# Patient Record
Sex: FEMALE | Race: WHITE | NOT HISPANIC OR LATINO | Employment: FULL TIME | URBAN - METROPOLITAN AREA
[De-identification: names, ages, dates, MRNs, and addresses within clinical notes are randomized per-mention and may not be internally consistent; named-entity substitution may affect disease eponyms.]

---

## 2017-02-01 LAB
HBV SURFACE AG SERPL QL IA: NEGATIVE
RUBELLA ANTIBODY IGG QUANTITATIVE: NORMAL IU/ML

## 2017-04-27 ENCOUNTER — HOSPITAL ENCOUNTER (OUTPATIENT)
Dept: ULTRASOUND IMAGING | Facility: CLINIC | Age: 27
Discharge: HOME | End: 2017-04-27
Attending: OBSTETRICS & GYNECOLOGY | Admitting: OBSTETRICS & GYNECOLOGY
Payer: COMMERCIAL

## 2017-04-27 ENCOUNTER — OFFICE VISIT (OUTPATIENT)
Dept: MATERNAL FETAL MEDICINE | Facility: CLINIC | Age: 27
End: 2017-04-27
Attending: OBSTETRICS & GYNECOLOGY
Payer: COMMERCIAL

## 2017-04-27 ENCOUNTER — PRE VISIT (OUTPATIENT)
Dept: MATERNAL FETAL MEDICINE | Facility: CLINIC | Age: 27
End: 2017-04-27

## 2017-04-27 DIAGNOSIS — O34.30 CERVICAL FUNNELING AFFECTING PREGNANCY: Primary | ICD-10-CM

## 2017-04-27 DIAGNOSIS — O26.90 PREGNANCY RELATED CONDITION, UNSPECIFIED TRIMESTER: ICD-10-CM

## 2017-04-27 PROCEDURE — 76817 TRANSVAGINAL US OBSTETRIC: CPT | Performed by: OBSTETRICS & GYNECOLOGY

## 2017-04-27 PROCEDURE — 76811 OB US DETAILED SNGL FETUS: CPT

## 2017-04-27 NOTE — MR AVS SNAPSHOT
After Visit Summary   4/27/2017    Ashley Cordon    MRN: [id number]           Patient Information     Date Of Birth          1990        Visit Information        Provider Department      4/27/2017 12:15 PM Kassy Mijares,  [facility] Maternal Fetal Medicine [facility]        Today's Diagnoses     Cervical funneling affecting pregnancy    -  1       Follow-ups after your visit        Your next 10 appointments already scheduled     May 01, 2017  1:30 PM CDT   MFM US OB TRANSVAGINAL with SHMFMUSR3   [facility] Maternal Fetal Medicine Ultrasound - [facility] ([facility])    [address] [place] [address] [address]  [facility] [id number] [phone]           Wear comfortable clothes and leave your valuables at home.            May 01, 2017  2:00 PM CDT   Radiology MD with  RUPINDERM MD   [facility] Maternal Fetal Medicine [facility] ([facility])    [address] [place] [address]  [facility] [id number] [phone]           Please arrive at the time given for your first appointment.  This visit is used internally to schedule the physician's time during your ultrasound.              Future tests that were ordered for you today     Open Future Orders        Priority Expected Expires Ordered    MFM US OB Transvaginal Routine 5/1/2017 2/27/2018 4/27/2017    MFM US Comprehensive Single Routine  2/27/2018 4/27/2017            Who to contact     If you have questions or need follow up information about today's clinic visit or your schedule please contact [facility] MATERNAL FETAL [facility] directly at [phone].  Normal or non-critical lab and imaging results will be communicated to you by MyChart, letter or phone within 4 business days after the clinic has received the results. If you do not hear from us within 7 days, please contact the clinic through MyChart or phone. If you have a critical or abnormal lab result, we will notify you by phone as soon as  "possible.  Submit refill requests through [employer] or call your pharmacy and they will forward the refill request to us. Please allow 3 business days for your refill to be completed.          Additional Information About Your Visit        [employer] Information     [employer] lets you send messages to your doctor, view your test results, renew your prescriptions, schedule appointments and more. To sign up, go to www.[email or web address] . Click on \"Log in\" on the left side of the screen, which will take you to the Welcome page. Then click on \"Sign up Now\" on the right side of the page.     You will be asked to enter the access code listed below, as well as some personal information. Please follow the directions to create your username and password.     Your access code is: [email or web address]  Expires: 2017  1:43 PM     Your access code will  in 90 days. If you need help or a new code, please call your [place] clinic or [phone].        Care EveryWhere ID     This is your Care EveryWhere ID. This could be used by other organizations to access your [place] medical records  [id number]        Your Vitals Were     Last Period                   2016            Blood Pressure from Last 3 Encounters:   No data found for BP    Weight from Last 3 Encounters:   No data found for Wt               Primary Care Provider    None Specified       No primary provider on file.        Thank you!     Thank you for choosing [facility]  for your care. Our goal is always to provide you with excellent care. Hearing back from our patients is one way we can continue to improve our services. Please take a few minutes to complete the written survey that you may receive in the mail after your visit with us. Thank you!             Your Updated Medication List - Protect others around you: Learn how to safely use, store and throw away your medicines at www.disposemymeds.org.      Notice  As of 2017  " 1:43 PM    You have not been prescribed any medications.

## 2017-04-27 NOTE — PROGRESS NOTES
"Please see \"Imaging\" tab under \"Chart Review\" for details of today's US.    Kassy Mijares, DO    "

## 2017-04-28 ENCOUNTER — HOSPITAL ENCOUNTER (OUTPATIENT)
Facility: CLINIC | Age: 27
Discharge: HOME | End: 2017-04-28
Attending: OBSTETRICS & GYNECOLOGY | Admitting: OBSTETRICS & GYNECOLOGY
Payer: COMMERCIAL

## 2017-04-28 VITALS
HEIGHT: 61 IN | TEMPERATURE: 98.2 F | DIASTOLIC BLOOD PRESSURE: 56 MMHG | SYSTOLIC BLOOD PRESSURE: 111 MMHG | WEIGHT: 123 LBS | HEART RATE: 75 BPM | RESPIRATION RATE: 16 BRPM | BODY MASS INDEX: 23.22 KG/M2

## 2017-04-28 PROCEDURE — 96374 THER/PROPH/DIAG INJ IV PUSH: CPT

## 2017-04-28 PROCEDURE — 96361 HYDRATE IV INFUSION ADD-ON: CPT

## 2017-04-28 PROCEDURE — 25000128 H RX IP 250 OP 636: Performed by: OBSTETRICS & GYNECOLOGY

## 2017-04-28 PROCEDURE — 99214 OFFICE O/P EST MOD 30 MIN: CPT | Mod: 25

## 2017-04-28 PROCEDURE — 25800025 ZZH RX 258

## 2017-04-28 PROCEDURE — 25800025 ZZH RX 258: Performed by: OBSTETRICS & GYNECOLOGY

## 2017-04-28 RX ORDER — DEXTROSE, SODIUM CHLORIDE, SODIUM LACTATE, POTASSIUM CHLORIDE, AND CALCIUM CHLORIDE 5; .6; .31; .03; .02 G/100ML; G/100ML; G/100ML; G/100ML; G/100ML
1000 INJECTION, SOLUTION INTRAVENOUS ONCE
Status: COMPLETED | OUTPATIENT
Start: 2017-04-28 | End: 2017-04-28

## 2017-04-28 RX ORDER — DEXTROSE MONOHYDRATE, SODIUM CHLORIDE, AND POTASSIUM CHLORIDE 50; 1.49; 4.5 G/1000ML; G/1000ML; G/1000ML
INJECTION, SOLUTION INTRAVENOUS CONTINUOUS
Status: DISCONTINUED | OUTPATIENT
Start: 2017-04-28 | End: 2017-04-28 | Stop reason: HOSPADM

## 2017-04-28 RX ORDER — ONDANSETRON 2 MG/ML
INJECTION INTRAMUSCULAR; INTRAVENOUS
Status: DISCONTINUED
Start: 2017-04-28 | End: 2017-04-28 | Stop reason: HOSPADM

## 2017-04-28 RX ORDER — ONDANSETRON 2 MG/ML
4 INJECTION INTRAMUSCULAR; INTRAVENOUS EVERY 6 HOURS PRN
Status: DISCONTINUED | OUTPATIENT
Start: 2017-04-28 | End: 2017-04-28 | Stop reason: HOSPADM

## 2017-04-28 RX ORDER — DEXTROSE, SODIUM CHLORIDE, SODIUM LACTATE, POTASSIUM CHLORIDE, AND CALCIUM CHLORIDE 5; .6; .31; .03; .02 G/100ML; G/100ML; G/100ML; G/100ML; G/100ML
125 INJECTION, SOLUTION INTRAVENOUS CONTINUOUS
Status: DISCONTINUED | OUTPATIENT
Start: 2017-04-28 | End: 2017-04-28 | Stop reason: HOSPADM

## 2017-04-28 RX ORDER — PRENATAL VIT/IRON FUM/FOLIC AC 27MG-0.8MG
1 TABLET ORAL DAILY
COMMUNITY
End: 2019-10-11

## 2017-04-28 RX ADMIN — POTASSIUM CHLORIDE, DEXTROSE MONOHYDRATE AND SODIUM CHLORIDE 1000 ML: 150; 5; 450 INJECTION, SOLUTION INTRAVENOUS at 16:44

## 2017-04-28 RX ADMIN — ONDANSETRON 4 MG: 2 SOLUTION INTRAMUSCULAR; INTRAVENOUS at 15:53

## 2017-04-28 RX ADMIN — SODIUM CHLORIDE, SODIUM LACTATE, POTASSIUM CHLORIDE, CALCIUM CHLORIDE AND DEXTROSE MONOHYDRATE 1000 ML: 5; 600; 310; 30; 20 INJECTION, SOLUTION INTRAVENOUS at 15:46

## 2017-04-28 NOTE — PLAN OF CARE
Received patient from home for IV hydration due to vomiting today since ~0930.   at 20w3d gestation.  Patient denies vaginal bleeding, uterine cramping, pelvic pressure, and leaking of fluid.  Discussed plan of care to include IV fluids and IV Zofran.  Verbal consent obtained for treatment.  IV started. Doptones done. [place] applied. Admission database completed.  Call light within reach, spouse at bedside.    1645 No emesis since arrival.  Denies needs at this time.    1755 IV hydration complete.  Patient feels better and desires to go get dinner.  Denies any cramping, +FM reported.  Dr. Huizar on unit and updated. Discharged to home.  Patient education pamphlets given on fetal movement counts and discharge summary. Patient instructed to report change in fetal movement, vaginal leaking of fluid or bleeding, abdominal pain, or any concerns related to the pregnancy to her nurse/physician.  Patient verbalized understanding of education and verbalized agreement with plan. Discharged ambulatory at 1755.    IV hydration for hyperemesis.  IV start time: 1546  IV stop time: 1745

## 2017-04-28 NOTE — IP AVS SNAPSHOT
[address], [address]    [facility] [address]    Phone:  [phone]                                       After Visit Summary   4/28/2017    Ashley Cordon    MRN: [id number]           After Visit Summary Signature Page     I have received my discharge instructions, and my questions have been answered. I have discussed any challenges I see with this plan with the nurse or doctor.    ..........................................................................................................................................  Patient/Patient Representative Signature      ..........................................................................................................................................  Patient Representative Print Name and Relationship to Patient    ..................................................               ................................................  Date                                            Time    ..........................................................................................................................................  Reviewed by Signature/Title    ...................................................              ..............................................  Date                                                            Time

## 2017-04-28 NOTE — DISCHARGE INSTRUCTIONS
Discharge Instruction for Undelivered Patients      You were seen for: IV Hydration due to hyperemesis  We Consulted: Dr. Jorge  You had (Test or Medicine):IV hydration, IV Zofran, doptones, and uterine monitoring     Diet:   Drink 8 to 12 glasses of liquids (milk, juice, water) every day.  You may eat meals and snacks.     Activity:  As Tolerated    Call your provider if you notice:  Swelling in your face or increased swelling in your hands or legs.  Headaches that are not relieved by Tylenol (acetaminophen).  Changes in your vision (blurring: seeing spots or stars.)  Nausea (sick to your stomach) and vomiting (throwing up).   Weight gain of 5 pounds or more per week.  Heartburn that doesn't go away.  Signs of bladder infection: pain when you urinate (use the toilet), need to go more often and more urgently.  The bag of conway (rupture of membranes) breaks, or you notice leaking in your underwear.  Bright red blood in your underwear.  Abdominal (lower belly) or stomach pain.  For first baby: Contractions (tightening) less than 5 minutes apart for one hour or more.  Second (plus) baby: Contractions (tightening) less than 10 minutes apart and getting stronger.  *If less than 34 weeks: Contractions (tightenings) more than 6 times in one hour.  Increase or change in vaginal discharge (note the color and amount)    Follow-up:  As scheduled in the clinic

## 2017-04-28 NOTE — IP AVS SNAPSHOT
MRN:[id number]                      After Visit Summary   4/28/2017    Ashley Cordon    MRN: [id number]           Thank you!     Thank you for choosing [facility] for your care. Our goal is always to provide you with excellent care. Hearing back from our patients is one way we can continue to improve our services. Please take a few minutes to complete the written survey that you may receive in the mail after you visit with us. Thank you!        Patient Information     Date Of Birth          1990        About your hospital stay     You were admitted on:  April 28, 2017 You last received care in the:  [facility]    You were discharged on:  April 28, 2017       Who to Call     For medical emergencies, please call 911.  For non-urgent questions about your medical care, please call your primary care provider or clinic, [phone]          Attending Provider     Provider Specialty    Nicole Huizar MD OB/Gyn    Katherine Douglass MD OB/Gyn       Primary Care Provider Office Phone # Fax #    Rocío Wilkinson [phone]       [address] [address]        Your next 10 appointments already scheduled     May 01, 2017  1:30 PM CDT   MFM US OB TRANSVAGINAL with SHMFMUSR3   [facility] Maternal Fetal Medicine Ultrasound - [facility])    [address] [place] [id number] [phone]           Wear comfortable clothes and leave your valuables at home.            May 01, 2017  2:00 PM CDT   Radiology MD with  MILANA FONSECA   [facility]etal [facility] - [facility])    [address] [place] [id number] [phone]           Please arrive at the time given for your first appointment.  This visit is used internally to schedule the physician's time during your ultrasound.              Further instructions from your care team      "  Discharge Instruction for Undelivered Patients      You were seen for: IV Hydration due to hyperemesis  We Consulted: Dr. Jorge  You had (Test or Medicine):IV hydration, IV Zofran, doptones, and uterine monitoring     Diet:   Drink 8 to 12 glasses of liquids (milk, juice, water) every day.  You may eat meals and snacks.     Activity:  As Tolerated    Call your provider if you notice:  Swelling in your face or increased swelling in your hands or legs.  Headaches that are not relieved by Tylenol (acetaminophen).  Changes in your vision (blurring: seeing spots or stars.)  Nausea (sick to your stomach) and vomiting (throwing up).   Weight gain of 5 pounds or more per week.  Heartburn that doesn't go away.  Signs of bladder infection: pain when you urinate (use the toilet), need to go more often and more urgently.  The bag of conway (rupture of membranes) breaks, or you notice leaking in your underwear.  Bright red blood in your underwear.  Abdominal (lower belly) or stomach pain.  For first baby: Contractions (tightening) less than 5 minutes apart for one hour or more.  Second (plus) baby: Contractions (tightening) less than 10 minutes apart and getting stronger.  *If less than 34 weeks: Contractions (tightenings) more than 6 times in one hour.  Increase or change in vaginal discharge (note the color and amount)    Follow-up:  As scheduled in the clinic          Pending Results     No orders found from 4/26/2017 to 4/29/2017.            Admission Information     Date & Time Provider Department Dept. Phone    4/28/2017 Katherine Douglass MD [facility] [phone]      Your Vitals Were     Blood Pressure Pulse Temperature Respirations Height Weight    111/56 75 98.2  F (36.8  C) (Oral) 16 1.549 m (5' 1\") 55.8 kg (123 lb)    Last Period BMI (Body Mass Index)                12/06/2016 23.24 kg/m2          MyChart Information     [employer] lets you send messages to your doctor, view your test " "results, renew your prescriptions, schedule appointments and more. To sign up, go to [email or web address] . Click on \"Log in\" on the left side of the screen, which will take you to the Welcome page. Then click on \"Sign up Now\" on the right side of the page.     You will be asked to enter the access code listed below, as well as some personal information. Please follow the directions to create your username and password.     Your access code is: [email or web address]  Expires: 2017  1:43 PM     Your access code will  in 90 days. If you need help or a new code, please call your [place] clinic or [phone].        Care EveryWhere ID     This is your Care EveryWhere ID. This could be used by other organizations to access your [place] medical records  [id number]           Review of your medicines      UNREVIEWED medicines. Ask your doctor about these medicines        Dose / Directions    ASPIRIN PO        Dose:  81 mg   Take 81 mg by mouth daily   Refills:  0       prenatal multivitamin  plus iron 27-0.8 MG Tabs per tablet        Dose:  1 tablet   Take 1 tablet by mouth daily   Refills:  0       vitamin B complex with vitamin C Tabs tablet        Dose:  1 tablet   Take 1 tablet by mouth daily   Refills:  0                Protect others around you: Learn how to safely use, store and throw away your medicines at www.disposemymeds.org.             Medication List: This is a list of all your medications and when to take them. Check marks below indicate your daily home schedule. Keep this list as a reference.      Medications           Morning Afternoon Evening Bedtime As Needed    ASPIRIN PO   Take 81 mg by mouth daily                                prenatal multivitamin  plus iron 27-0.8 MG Tabs per tablet   Take 1 tablet by mouth daily                                vitamin B complex with vitamin C Tabs tablet   Take 1 tablet by mouth daily                                  "

## 2017-05-01 ENCOUNTER — OFFICE VISIT (OUTPATIENT)
Dept: MATERNAL FETAL MEDICINE | Facility: CLINIC | Age: 27
End: 2017-05-01
Attending: OBSTETRICS & GYNECOLOGY
Payer: COMMERCIAL

## 2017-05-01 ENCOUNTER — HOSPITAL ENCOUNTER (OUTPATIENT)
Dept: ULTRASOUND IMAGING | Facility: CLINIC | Age: 27
Discharge: HOME | End: 2017-05-01
Attending: OBSTETRICS & GYNECOLOGY | Admitting: OBSTETRICS & GYNECOLOGY
Payer: COMMERCIAL

## 2017-05-01 DIAGNOSIS — O34.30 CERVICAL FUNNELING AFFECTING PREGNANCY: ICD-10-CM

## 2017-05-01 DIAGNOSIS — O26.879 SHORT CERVIX AFFECTING PREGNANCY: Primary | ICD-10-CM

## 2017-05-01 PROCEDURE — 76817 TRANSVAGINAL US OBSTETRIC: CPT

## 2017-05-01 NOTE — PROGRESS NOTES
Please see ultrasound report under imaging tab for details on ultrasound performed today.    Vesta Hidalgo  Maternal-Fetal Medicine Specialist  Academic Office [phone]  Pager [phone]

## 2017-05-01 NOTE — MR AVS SNAPSHOT
After Visit Summary   5/1/2017    Ashley Cordon    MRN: [id number]           Patient Information     Date Of Birth          1990        Visit Information        Provider Department      5/1/2017 2:00 PM Vesta Hidalgo MD [facility] Maternal Fetal Medicine [facility]        Today's Diagnoses     Short cervix affecting pregnancy    -  1    Cervical funneling affecting pregnancy           Follow-ups after your visit        Your next 10 appointments already scheduled     May 08, 2017 10:15 AM CDT   MFM US OB TRANSVAGINAL with SHMFMUSR1   [facility] Maternal Fetal Medicine Ultrasound - [facility] ([facility])    [address] [place] [address]  [facility] [id number] [phone]           Wear comfortable clothes and leave your valuables at home.            May 08, 2017 10:45 AM CDT   Radiology MD with SH MILANA FONSECA   [facility] Maternal Fetal Medicine [facility] ([facility])    [address] [place] [address]  [facility] [phone]           Please arrive at the time given for your first appointment.  This visit is used internally to schedule the physician's time during your ultrasound.              Future tests that were ordered for you today     Open Future Orders        Priority Expected Expires Ordered    MFM US OB Transvaginal Routine 5/8/2017 3/1/2018 5/1/2017            Who to contact     If you have questions or need follow up information about today's clinic visit or your schedule please contact [facility] MATERNAL FETAL MEDIC[facility] directly at [phone].  Normal or non-critical lab and imaging results will be communicated to you by MyChart, letter or phone within 4 business days after the clinic has received the results. If you do not hear from us within 7 days, please contact the clinic through MyChart or phone. If you have a critical or abnormal lab result, we will notify you by phone as soon as possible.  Submit refill requests  "through [employer] or call your pharmacy and they will forward the refill request to us. Please allow 3 business days for your refill to be completed.          Additional Information About Your Visit        [employer] Information     [employer] lets you send messages to your doctor, view your test results, renew your prescriptions, schedule appointments and more. To sign up, go to [email or web address] . Click on \"Log in\" on the left side of the screen, which will take you to the Welcome page. Then click on \"Sign up Now\" on the right side of the page.     You will be asked to enter the access code listed below, as well as some personal information. Please follow the directions to create your username and password.     Your access code is: [email or web address]  Expires: 2017  1:43 PM     Your access code will  in 90 days. If you need help or a new code, please call your [place] clinic or [phone].        Care EveryWhere ID     This is your Care EveryWhere ID. This could be used by other organizations to access your [place] medical records  [id number]        Your Vitals Were     Last Period                   2016            Blood Pressure from Last 3 Encounters:   17 111/56    Weight from Last 3 Encounters:   17 55.8 kg (123 lb)               Primary Care Provider Office Phone # Fax #    Roíco Wilkinson [phone]       [address] [address]        Thank you!     Thank you for choosing [facility]  for your care. Our goal is always to provide you with excellent care. Hearing back from our patients is one way we can continue to improve our services. Please take a few minutes to complete the written survey that you may receive in the mail after your visit with us. Thank you!             Your Updated Medication List - Protect others around you: Learn how to safely use, store and throw away your medicines at " www.disposemymeds.org.          This list is accurate as of: 5/1/17  2:26 PM.  Always use your most recent med list.                   Brand Name Dispense Instructions for use    ASPIRIN PO      Take 81 mg by mouth daily       prenatal multivitamin  plus iron 27-0.8 MG Tabs per tablet      Take 1 tablet by mouth daily       vitamin B complex with vitamin C Tabs tablet      Take 1 tablet by mouth daily

## 2017-05-06 ENCOUNTER — HOSPITAL ENCOUNTER (OUTPATIENT)
Facility: CLINIC | Age: 27
End: 2017-05-06
Admitting: OBSTETRICS & GYNECOLOGY
Payer: COMMERCIAL

## 2017-05-06 ENCOUNTER — HOSPITAL ENCOUNTER (OUTPATIENT)
Facility: CLINIC | Age: 27
Discharge: HOME | End: 2017-05-06
Attending: OBSTETRICS & GYNECOLOGY | Admitting: OBSTETRICS & GYNECOLOGY
Payer: COMMERCIAL

## 2017-05-06 VITALS
BODY MASS INDEX: 23.03 KG/M2 | WEIGHT: 122 LBS | HEIGHT: 61 IN | RESPIRATION RATE: 18 BRPM | SYSTOLIC BLOOD PRESSURE: 112 MMHG | DIASTOLIC BLOOD PRESSURE: 68 MMHG | TEMPERATURE: 97.9 F | HEART RATE: 97 BPM

## 2017-05-06 LAB — A1 MICROGLOB PLACENTAL VAG QL: NEGATIVE

## 2017-05-06 PROCEDURE — 99214 OFFICE O/P EST MOD 30 MIN: CPT

## 2017-05-06 PROCEDURE — 84112 EVAL AMNIOTIC FLUID PROTEIN: CPT | Performed by: OBSTETRICS & GYNECOLOGY

## 2017-05-06 RX ORDER — ONDANSETRON 2 MG/ML
4 INJECTION INTRAMUSCULAR; INTRAVENOUS EVERY 6 HOURS PRN
Status: DISCONTINUED | OUTPATIENT
Start: 2017-05-06 | End: 2017-05-06 | Stop reason: HOSPADM

## 2017-05-06 NOTE — IP AVS SNAPSHOT
[address], [address]    [facility] [address]    Phone:  [phone]                                       After Visit Summary   5/6/2017    Ashley Cordon    MRN: [id number]           After Visit Summary Signature Page     I have received my discharge instructions, and my questions have been answered. I have discussed any challenges I see with this plan with the nurse or doctor.    ..........................................................................................................................................  Patient/Patient Representative Signature      ..........................................................................................................................................  Patient Representative Print Name and Relationship to Patient    ..................................................               ................................................  Date                                            Time    ..........................................................................................................................................  Reviewed by Signature/Title    ...................................................              ..............................................  Date                                                            Time

## 2017-05-06 NOTE — DISCHARGE INSTRUCTIONS
Discharge Instruction for Undelivered Patients      You were seen for:  labor  We Consulted: Dr. Hernandez  You had (Test or Medicine):Amnisure, monitoring and SVE     Diet:   Drink 8 to 12 glasses of liquids (milk, juice, water) every day.  You may eat meals and snacks.     Activity:  Call your doctor or nurse midwife if your baby is moving less than usual.     Call your provider if you notice:  Swelling in your face or increased swelling in your hands or legs.  Headaches that are not relieved by Tylenol (acetaminophen).  Changes in your vision (blurring: seeing spots or stars.)  Nausea (sick to your stomach) and vomiting (throwing up).   Weight gain of 5 pounds or more per week.  Heartburn that doesn't go away.  Signs of bladder infection: pain when you urinate (use the toilet), need to go more often and more urgently.  The bag of conway (rupture of membranes) breaks, or you notice leaking in your underwear.  Bright red blood in your underwear.  Abdominal (lower belly) or stomach pain.  For first baby: Contractions (tightening) less than 5 minutes apart for one hour or more.  Second (plus) baby: Contractions (tightening) less than 10 minutes apart and getting stronger.  *If less than 34 weeks: Contractions (tightenings) more than 6 times in one hour.  Increase or change in vaginal discharge (note the color and amount)      Follow-up:  As scheduled in the clinic

## 2017-05-06 NOTE — IP AVS SNAPSHOT
MRN:[id number]                      After Visit Summary   5/6/2017    Ashley Cordon    MRN: [id number]           Thank you!     Thank you for choosing [facility] for your care. Our goal is always to provide you with excellent care. Hearing back from our patients is one way we can continue to improve our services. Please take a few minutes to complete the written survey that you may receive in the mail after you visit with us. Thank you!        Patient Information     Date Of Birth          1990        About your hospital stay     You were admitted on:  May 6, 2017 You last received care in the:  [facility]    You were discharged on:  May 6, 2017       Who to Call     For medical emergencies, please call 911.  For non-urgent questions about your medical care, please call your primary care provider or clinic, [phone]          Attending Provider     Provider Specialty    John Hernandez MD OB/Gyn       Primary Care Provider Office Phone # Fax #    Rocío Wilkinson [phone]       [address]    [facility] [address]        Your next 10 appointments already scheduled     May 08, 2017 10:15 AM CDT   MFM US OB TRANSVAGINAL with [id number]   [facility])    [address] [place] [id number] [phone]           Wear comfortable clothes and leave your valuables at home.            May 08, 2017 10:45 AM CDT   Radiology MD with SH MILANA FONSECA   [facility] - [address] [id number] [phone]           Please arrive at the time given for your first appointment.  This visit is used internally to schedule the physician's time during your ultrasound.              Further instructions from your care team       Discharge Instruction for Undelivered  "Patients      You were seen for:  labor  We Consulted: Dr. Hernandez  You had (Test or Medicine):Amnisure, monitoring and SVE     Diet:   Drink 8 to 12 glasses of liquids (milk, juice, water) every day.  You may eat meals and snacks.     Activity:  Call your doctor or nurse midwife if your baby is moving less than usual.     Call your provider if you notice:  Swelling in your face or increased swelling in your hands or legs.  Headaches that are not relieved by Tylenol (acetaminophen).  Changes in your vision (blurring: seeing spots or stars.)  Nausea (sick to your stomach) and vomiting (throwing up).   Weight gain of 5 pounds or more per week.  Heartburn that doesn't go away.  Signs of bladder infection: pain when you urinate (use the toilet), need to go more often and more urgently.  The bag of conway (rupture of membranes) breaks, or you notice leaking in your underwear.  Bright red blood in your underwear.  Abdominal (lower belly) or stomach pain.  For first baby: Contractions (tightening) less than 5 minutes apart for one hour or more.  Second (plus) baby: Contractions (tightening) less than 10 minutes apart and getting stronger.  *If less than 34 weeks: Contractions (tightenings) more than 6 times in one hour.  Increase or change in vaginal discharge (note the color and amount)      Follow-up:  As scheduled in the clinic          Pending Results     No orders found from 2017 to 2017.            Admission Information     Date & Time Provider Department Dept. Phone    2017 John Hernandez MD [facility] [phone]      Your Vitals Were     Blood Pressure Pulse Temperature Respirations Height Weight    112/68 97 97.9  F (36.6  C) (Temporal) 18 1.549 m (5' 1\") 55.3 kg (122 lb)    Last Period BMI (Body Mass Index)                2016 23.05 kg/m2          MyChart Information     ThirstyVIPt lets you send messages to your doctor, view your test results, renew your " "prescriptions, schedule appointments and more. To sign up, go to [email or web address] . Click on \"Log in\" on the left side of the screen, which will take you to the Welcome page. Then click on \"Sign up Now\" on the right side of the page.     You will be asked to enter the access code listed below, as well as some personal information. Please follow the directions to create your username and password.     Your access code is: [email or web address]  Expires: 2017  1:43 PM     Your access code will  in 90 days. If you need help or a new code, please call your [place] clinic or [phone].        Care EveryWhere ID     This is your Care EveryWhere ID. This could be used by other organizations to access your [place] medical records  [id number]           Review of your medicines      UNREVIEWED medicines. Ask your doctor about these medicines        Dose / Directions    ASPIRIN PO        Dose:  81 mg   Take 81 mg by mouth daily   Refills:  0       prenatal multivitamin  plus iron 27-0.8 MG Tabs per tablet        Dose:  1 tablet   Take 1 tablet by mouth daily   Refills:  0       progesterone vaginal cream   Indication:  progesterone suppository        Refills:  0       vitamin B complex with vitamin C Tabs tablet        Dose:  1 tablet   Take 1 tablet by mouth daily   Refills:  0                Protect others around you: Learn how to safely use, store and throw away your medicines at www.disposemymeds.org.             Medication List: This is a list of all your medications and when to take them. Check marks below indicate your daily home schedule. Keep this list as a reference.      Medications           Morning Afternoon Evening Bedtime As Needed    ASPIRIN PO   Take 81 mg by mouth daily                                prenatal multivitamin  plus iron 27-0.8 MG Tabs per tablet   Take 1 tablet by mouth daily                                progesterone vaginal cream                                vitamin B complex " with vitamin C Tabs tablet   Take 1 tablet by mouth daily

## 2017-05-06 NOTE — PROGRESS NOTES
Patient presented to [facility] with complaints of cramping since about 0530 this morning and seeing some clear fluid when she went to the bathroom. Verbal permission given to place monitors. Support persons Raghavendra present. Amnisure collected, SVE performed by Brandy Sun RN.

## 2017-05-06 NOTE — PROVIDER NOTIFICATION
05/06/17 1140   Provider Notification   Provider Name/Title Dr. Hernandez   Method of Notification Phone   Request Evaluate - Remote   Notification Reason SVE;Status Update;Uterine Activity   Dr. Hernandez called and updated on all but not limited to SVE closed, amnisure negative, and no contractions noted on monitor nor is patient feeling any. Verbal orders to discharge patient.

## 2017-05-08 ENCOUNTER — OFFICE VISIT (OUTPATIENT)
Dept: MATERNAL FETAL MEDICINE | Facility: CLINIC | Age: 27
End: 2017-05-08
Attending: OBSTETRICS & GYNECOLOGY
Payer: COMMERCIAL

## 2017-05-08 ENCOUNTER — HOSPITAL ENCOUNTER (OUTPATIENT)
Dept: ULTRASOUND IMAGING | Facility: CLINIC | Age: 27
Discharge: HOME | End: 2017-05-08
Attending: OBSTETRICS & GYNECOLOGY | Admitting: OBSTETRICS & GYNECOLOGY
Payer: COMMERCIAL

## 2017-05-08 DIAGNOSIS — O34.30 CERVICAL FUNNELING AFFECTING PREGNANCY: Primary | ICD-10-CM

## 2017-05-08 DIAGNOSIS — O26.879 SHORT CERVIX AFFECTING PREGNANCY: ICD-10-CM

## 2017-05-08 PROCEDURE — 76817 TRANSVAGINAL US OBSTETRIC: CPT

## 2017-05-08 NOTE — LETTER
[facility]  [address] [place] [address]  [address]  [facility] [id number]  [phone]          May 8, 2017    RE:  Ashley Cordon                                                                                                                                                       [address] [facility]   [facility] [address]            To whom it may concern:    Ashley Cordon is under my professional care for her high risk pregnancy.  She will remain working 0.7 FTE, but is requesting to not work more than 2 consecutive work days in a row.  Given her shortened cervical length I feel that if you can accommodate this request it would benefit her pregnancy.        Sincerely,        Kassy Mijares, DO

## 2017-05-08 NOTE — MR AVS SNAPSHOT
"              After Visit Summary   2017    Ashley Cordon    MRN: [id number]           Patient Information     Date Of Birth          1990        Visit Information        Provider Department      2017 10:45 AM Kassy Mijares, DO [facility] Maternal Fetal Medicine [facility]        Today's Diagnoses     Cervical funneling affecting pregnancy    -  1       Follow-ups after your visit        Who to contact     If you have questions or need follow up information about today's clinic visit or your schedule please contact [facility] MATERNAL FETAL MEDICINE [facility] directly at [phone].  Normal or non-critical lab and imaging results will be communicated to you by lingoking GmbHhart, letter or phone within 4 business days after the clinic has received the results. If you do not hear from us within 7 days, please contact the clinic through katenat or phone. If you have a critical or abnormal lab result, we will notify you by phone as soon as possible.  Submit refill requests through DigiMeld or call your pharmacy and they will forward the refill request to us. Please allow 3 business days for your refill to be completed.          Additional Information About Your Visit        MyChart Information     DigiMeld lets you send messages to your doctor, view your test results, renew your prescriptions, schedule appointments and more. To sign up, go to [email or web address] . Click on \"Log in\" on the left side of the screen, which will take you to the Welcome page. Then click on \"Sign up Now\" on the right side of the page.     You will be asked to enter the access code listed below, as well as some personal information. Please follow the directions to create your username and password.     Your access code is: [email or web address]  Expires: 2017  1:43 PM     Your access code will  in 90 days. If you need help or a new code, please call your [place] clinic or [phone].        Care EveryWhere ID     This is your " Care EveryWhere ID. This could be used by other organizations to access your [place] medical records  [id number]        Your Vitals Were     Last Period                   12/06/2016            Blood Pressure from Last 3 Encounters:   05/06/17 112/68   04/28/17 111/56    Weight from Last 3 Encounters:   05/06/17 55.3 kg (122 lb)   04/28/17 55.8 kg (123 lb)              Today, you had the following     No orders found for display       Primary Care Provider Office Phone # Fax #    Rocío Wilkinson [phone]       [facility] [address]    [facility] [address]        Thank you!     Thank you for choosing [facility]  for your care. Our goal is always to provide you with excellent care. Hearing back from our patients is one way we can continue to improve our services. Please take a few minutes to complete the written survey that you may receive in the mail after your visit with us. Thank you!             Your Updated Medication List - Protect others around you: Learn how to safely use, store and throw away your medicines at www.disposemymeds.org.          This list is accurate as of: 5/8/17 11:29 AM.  Always use your most recent med list.                   Brand Name Dispense Instructions for use    ASPIRIN PO      Take 81 mg by mouth daily       prenatal multivitamin  plus iron 27-0.8 MG Tabs per tablet      Take 1 tablet by mouth daily       progesterone vaginal cream          vitamin B complex with vitamin C Tabs tablet      Take 1 tablet by mouth daily

## 2017-06-27 ENCOUNTER — OFFICE VISIT (OUTPATIENT)
Dept: MATERNAL FETAL MEDICINE | Facility: CLINIC | Age: 27
End: 2017-06-27
Attending: OBSTETRICS & GYNECOLOGY
Payer: COMMERCIAL

## 2017-06-27 ENCOUNTER — HOSPITAL ENCOUNTER (OUTPATIENT)
Dept: ULTRASOUND IMAGING | Facility: CLINIC | Age: 27
Discharge: HOME | End: 2017-06-27
Attending: OBSTETRICS & GYNECOLOGY | Admitting: OBSTETRICS & GYNECOLOGY
Payer: COMMERCIAL

## 2017-06-27 DIAGNOSIS — Z03.74 FETAL GROWTH PROBLEM SUSPECTED BUT NOT FOUND: Primary | ICD-10-CM

## 2017-06-27 DIAGNOSIS — O26.90 PREGNANCY RELATED CONDITION, UNSPECIFIED TRIMESTER: ICD-10-CM

## 2017-06-27 PROCEDURE — 76816 OB US FOLLOW-UP PER FETUS: CPT

## 2017-06-27 NOTE — MR AVS SNAPSHOT
"              After Visit Summary   2017    Ashley Cordon    MRN: [id number]           Patient Information     Date Of Birth          1990        Visit Information        Provider Department      2017 10:45 AM Arun Humphreys MD [facility] Maternal Fetal Medicine [facility]        Today's Diagnoses     Fetal growth problem suspected but not found    -  1       Follow-ups after your visit        Who to contact     If you have questions or need follow up information about today's clinic visit or your schedule please contact [facility] FETAL [facility] directly at [phone].  Normal or non-critical lab and imaging results will be communicated to you by Jukin Mediahart, letter or phone within 4 business days after the clinic has received the results. If you do not hear from us within 7 days, please contact the clinic through Orationt or phone. If you have a critical or abnormal lab result, we will notify you by phone as soon as possible.  Submit refill requests through Kite or call your pharmacy and they will forward the refill request to us. Please allow 3 business days for your refill to be completed.          Additional Information About Your Visit        MyChart Information     Kite lets you send messages to your doctor, view your test results, renew your prescriptions, schedule appointments and more. To sign up, go to [email or web address] . Click on \"Log in\" on the left side of the screen, which will take you to the Welcome page. Then click on \"Sign up Now\" on the right side of the page.     You will be asked to enter the access code listed below, as well as some personal information. Please follow the directions to create your username and password.     Your access code is: [email or web address]  Expires: 2017  1:43 PM     Your access code will  in 90 days. If you need help or a new code, please call your [place] clinic or [phone].        Care EveryWhere ID     This is your " Care EveryWhere ID. This could be used by other organizations to access your [place] medical records  [id number]        Your Vitals Were     Last Period                   12/06/2016            Blood Pressure from Last 3 Encounters:   05/06/17 112/68   04/28/17 111/56    Weight from Last 3 Encounters:   05/06/17 55.3 kg (122 lb)   04/28/17 55.8 kg (123 lb)              Today, you had the following     No orders found for display       Primary Care Provider Office Phone # Fax #    Rocío Wiliknson [phone]       [facility] [address]    [facility] [address]        Equal Access to Services     [facility]: Hadii denver montoya hadmichelleo Socarmen, waaxda luqadaha, qaybta kaalmada ramónyada, elier hare . [facility] [phone].    ATENCIÓN: Si habla español, tiene a velasco disposición servicios gratuitos de asistencia lingüística. Llame al [phone].    We comply with applicable federal civil rights laws and [place] laws. We do not discriminate on the basis of race, color, national origin, age, disability sex, sexual orientation or gender identity.            Thank you!     Thank you for choosing [facility]  for your care. Our goal is always to provide you with excellent care. Hearing back from our patients is one way we can continue to improve our services. Please take a few minutes to complete the written survey that you may receive in the mail after your visit with us. Thank you!             Your Updated Medication List - Protect others around you: Learn how to safely use, store and throw away your medicines at www.disposemymeds.org.          This list is accurate as of: 6/27/17 11:36 AM.  Always use your most recent med list.                   Brand Name Dispense Instructions for use Diagnosis    ASPIRIN PO      Take 81 mg by mouth daily        prenatal multivitamin  plus iron 27-0.8 MG Tabs per tablet      Take 1 tablet by mouth  daily        progesterone vaginal cream           vitamin B complex with vitamin C Tabs tablet      Take 1 tablet by mouth daily

## 2017-06-27 NOTE — PROGRESS NOTES
"Please see \"Imaging\" tab under \"Chart Review\" for details of today's ultrasound.    Arun Humphreys M.D.  Specialist in Maternal-Fetal Medicine     "

## 2017-08-15 LAB — GROUP B STREP PCR: NEGATIVE

## 2017-09-05 ENCOUNTER — TRANSFERRED RECORDS (OUTPATIENT)
Dept: HEALTH INFORMATION MANAGEMENT | Facility: CLINIC | Age: 27
End: 2017-09-05

## 2017-09-12 ENCOUNTER — TRANSFERRED RECORDS (OUTPATIENT)
Dept: HEALTH INFORMATION MANAGEMENT | Facility: CLINIC | Age: 27
End: 2017-09-12

## 2017-09-15 ENCOUNTER — ANESTHESIA EVENT (OUTPATIENT)
Dept: OBGYN | Facility: CLINIC | Age: 27
End: 2017-09-15
Payer: COMMERCIAL

## 2017-09-15 ENCOUNTER — ANESTHESIA (OUTPATIENT)
Dept: OBGYN | Facility: CLINIC | Age: 27
End: 2017-09-15
Payer: COMMERCIAL

## 2017-09-15 ENCOUNTER — TRANSFERRED RECORDS (OUTPATIENT)
Dept: HEALTH INFORMATION MANAGEMENT | Facility: CLINIC | Age: 27
End: 2017-09-15

## 2017-09-15 ENCOUNTER — HOSPITAL ENCOUNTER (INPATIENT)
Facility: CLINIC | Age: 27
LOS: 3 days | Discharge: HOME | End: 2017-09-18
Attending: OBSTETRICS & GYNECOLOGY | Admitting: OBSTETRICS & GYNECOLOGY
Payer: COMMERCIAL

## 2017-09-15 LAB — T PALLIDUM IGG+IGM SER QL: NEGATIVE

## 2017-09-15 PROCEDURE — 86850 RBC ANTIBODY SCREEN: CPT | Performed by: OBSTETRICS & GYNECOLOGY

## 2017-09-15 PROCEDURE — 71000015 ZZH RECOVERY PHASE 1 LEVEL 2 EA ADDTL HR: Performed by: OBSTETRICS & GYNECOLOGY

## 2017-09-15 PROCEDURE — 88307 TISSUE EXAM BY PATHOLOGIST: CPT | Mod: 26 | Performed by: OBSTETRICS & GYNECOLOGY

## 2017-09-15 PROCEDURE — 00HU33Z INSERTION OF INFUSION DEVICE INTO SPINAL CANAL, PERCUTANEOUS APPROACH: ICD-10-PCS | Performed by: ANESTHESIOLOGY

## 2017-09-15 PROCEDURE — 86900 BLOOD TYPING SEROLOGIC ABO: CPT | Performed by: OBSTETRICS & GYNECOLOGY

## 2017-09-15 PROCEDURE — 12000029 ZZH R&B OB INTERMEDIATE

## 2017-09-15 PROCEDURE — 25000128 H RX IP 250 OP 636: Performed by: ANESTHESIOLOGY

## 2017-09-15 PROCEDURE — 25000128 H RX IP 250 OP 636: Performed by: NURSE ANESTHETIST, CERTIFIED REGISTERED

## 2017-09-15 PROCEDURE — 25000125 ZZHC RX 250: Performed by: PHYSICIAN ASSISTANT

## 2017-09-15 PROCEDURE — 25000128 H RX IP 250 OP 636

## 2017-09-15 PROCEDURE — 86900 BLOOD TYPING SEROLOGIC ABO: CPT | Performed by: PHYSICIAN ASSISTANT

## 2017-09-15 PROCEDURE — 25000132 ZZH RX MED GY IP 250 OP 250 PS 637: Performed by: OBSTETRICS & GYNECOLOGY

## 2017-09-15 PROCEDURE — 36000058 ZZH SURGERY LEVEL 3 EA 15 ADDTL MIN: Performed by: OBSTETRICS & GYNECOLOGY

## 2017-09-15 PROCEDURE — 25000132 ZZH RX MED GY IP 250 OP 250 PS 637: Performed by: PHYSICIAN ASSISTANT

## 2017-09-15 PROCEDURE — 10907ZC DRAINAGE OF AMNIOTIC FLUID, THERAPEUTIC FROM PRODUCTS OF CONCEPTION, VIA NATURAL OR ARTIFICIAL OPENING: ICD-10-PCS | Performed by: OBSTETRICS & GYNECOLOGY

## 2017-09-15 PROCEDURE — 37000011 ZZH ANESTHESIA WARD SERVICE

## 2017-09-15 PROCEDURE — 3E033VJ INTRODUCTION OF OTHER HORMONE INTO PERIPHERAL VEIN, PERCUTANEOUS APPROACH: ICD-10-PCS | Performed by: OBSTETRICS & GYNECOLOGY

## 2017-09-15 PROCEDURE — 86901 BLOOD TYPING SEROLOGIC RH(D): CPT | Performed by: OBSTETRICS & GYNECOLOGY

## 2017-09-15 PROCEDURE — 25000128 H RX IP 250 OP 636: Performed by: PHYSICIAN ASSISTANT

## 2017-09-15 PROCEDURE — 27210794 ZZH OR GENERAL SUPPLY STERILE: Performed by: OBSTETRICS & GYNECOLOGY

## 2017-09-15 PROCEDURE — 25000125 ZZHC RX 250: Performed by: NURSE ANESTHETIST, CERTIFIED REGISTERED

## 2017-09-15 PROCEDURE — 36415 COLL VENOUS BLD VENIPUNCTURE: CPT | Performed by: PHYSICIAN ASSISTANT

## 2017-09-15 PROCEDURE — 25000128 H RX IP 250 OP 636: Performed by: OBSTETRICS & GYNECOLOGY

## 2017-09-15 PROCEDURE — 71000014 ZZH RECOVERY PHASE 1 LEVEL 2 FIRST HR: Performed by: OBSTETRICS & GYNECOLOGY

## 2017-09-15 PROCEDURE — 37000008 ZZH ANESTHESIA TECHNICAL FEE, 1ST 30 MIN: Performed by: OBSTETRICS & GYNECOLOGY

## 2017-09-15 PROCEDURE — 88307 TISSUE EXAM BY PATHOLOGIST: CPT | Performed by: OBSTETRICS & GYNECOLOGY

## 2017-09-15 PROCEDURE — 36000056 ZZH SURGERY LEVEL 3 1ST 30 MIN: Performed by: OBSTETRICS & GYNECOLOGY

## 2017-09-15 PROCEDURE — 25000125 ZZHC RX 250: Performed by: ANESTHESIOLOGY

## 2017-09-15 PROCEDURE — 86780 TREPONEMA PALLIDUM: CPT | Performed by: PHYSICIAN ASSISTANT

## 2017-09-15 PROCEDURE — 37000009 ZZH ANESTHESIA TECHNICAL FEE, EACH ADDTL 15 MIN: Performed by: OBSTETRICS & GYNECOLOGY

## 2017-09-15 PROCEDURE — 3E0R3CZ INTRODUCTION OF REGIONAL ANESTHETIC INTO SPINAL CANAL, PERCUTANEOUS APPROACH: ICD-10-PCS | Performed by: ANESTHESIOLOGY

## 2017-09-15 RX ORDER — NALBUPHINE HYDROCHLORIDE 10 MG/ML
2.5-5 INJECTION, SOLUTION INTRAMUSCULAR; INTRAVENOUS; SUBCUTANEOUS EVERY 6 HOURS PRN
Status: DISCONTINUED | OUTPATIENT
Start: 2017-09-15 | End: 2017-09-16

## 2017-09-15 RX ORDER — DEXTROSE, SODIUM CHLORIDE, SODIUM LACTATE, POTASSIUM CHLORIDE, AND CALCIUM CHLORIDE 5; .6; .31; .03; .02 G/100ML; G/100ML; G/100ML; G/100ML; G/100ML
INJECTION, SOLUTION INTRAVENOUS CONTINUOUS
Status: DISCONTINUED | OUTPATIENT
Start: 2017-09-15 | End: 2017-09-18 | Stop reason: HOSPADM

## 2017-09-15 RX ORDER — FENTANYL CITRATE 50 UG/ML
100 INJECTION, SOLUTION INTRAMUSCULAR; INTRAVENOUS ONCE
Status: COMPLETED | OUTPATIENT
Start: 2017-09-15 | End: 2017-09-15

## 2017-09-15 RX ORDER — LIDOCAINE 40 MG/G
CREAM TOPICAL
Status: DISCONTINUED | OUTPATIENT
Start: 2017-09-15 | End: 2017-09-18 | Stop reason: HOSPADM

## 2017-09-15 RX ORDER — IBUPROFEN 400 MG/1
800 TABLET, FILM COATED ORAL
Status: DISCONTINUED | OUTPATIENT
Start: 2017-09-15 | End: 2017-09-16

## 2017-09-15 RX ORDER — OXYTOCIN/0.9 % SODIUM CHLORIDE 30/500 ML
1-24 PLASTIC BAG, INJECTION (ML) INTRAVENOUS CONTINUOUS
Status: DISCONTINUED | OUTPATIENT
Start: 2017-09-15 | End: 2017-09-16

## 2017-09-15 RX ORDER — SIMETHICONE 80 MG
80 TABLET,CHEWABLE ORAL 4 TIMES DAILY PRN
Status: DISCONTINUED | OUTPATIENT
Start: 2017-09-15 | End: 2017-09-18 | Stop reason: HOSPADM

## 2017-09-15 RX ORDER — ONDANSETRON 2 MG/ML
4 INJECTION INTRAMUSCULAR; INTRAVENOUS EVERY 6 HOURS PRN
Status: DISCONTINUED | OUTPATIENT
Start: 2017-09-15 | End: 2017-09-18 | Stop reason: HOSPADM

## 2017-09-15 RX ORDER — LIDOCAINE HCL/EPINEPHRINE/PF 2%-1:200K
VIAL (ML) INJECTION
Status: DISCONTINUED
Start: 2017-09-15 | End: 2017-09-15 | Stop reason: HOSPADM

## 2017-09-15 RX ORDER — IBUPROFEN 400 MG/1
400-800 TABLET, FILM COATED ORAL EVERY 6 HOURS PRN
Status: DISCONTINUED | OUTPATIENT
Start: 2017-09-15 | End: 2017-09-18 | Stop reason: HOSPADM

## 2017-09-15 RX ORDER — LIDOCAINE 40 MG/G
CREAM TOPICAL
Status: DISCONTINUED | OUTPATIENT
Start: 2017-09-15 | End: 2017-09-16

## 2017-09-15 RX ORDER — NALOXONE HYDROCHLORIDE 0.4 MG/ML
.1-.4 INJECTION, SOLUTION INTRAMUSCULAR; INTRAVENOUS; SUBCUTANEOUS
Status: DISCONTINUED | OUTPATIENT
Start: 2017-09-15 | End: 2017-09-16

## 2017-09-15 RX ORDER — BISACODYL 10 MG
10 SUPPOSITORY, RECTAL RECTAL DAILY PRN
Status: DISCONTINUED | OUTPATIENT
Start: 2017-09-17 | End: 2017-09-18 | Stop reason: HOSPADM

## 2017-09-15 RX ORDER — AMOXICILLIN 250 MG
1-2 CAPSULE ORAL 2 TIMES DAILY
Status: DISCONTINUED | OUTPATIENT
Start: 2017-09-15 | End: 2017-09-18 | Stop reason: HOSPADM

## 2017-09-15 RX ORDER — CEFAZOLIN SODIUM 1 G/3ML
1 INJECTION, POWDER, FOR SOLUTION INTRAMUSCULAR; INTRAVENOUS SEE ADMIN INSTRUCTIONS
Status: DISCONTINUED | OUTPATIENT
Start: 2017-09-15 | End: 2017-09-16 | Stop reason: HOSPADM

## 2017-09-15 RX ORDER — CITRIC ACID/SODIUM CITRATE 334-500MG
30 SOLUTION, ORAL ORAL
Status: COMPLETED | OUTPATIENT
Start: 2017-09-15 | End: 2017-09-15

## 2017-09-15 RX ORDER — ROPIVACAINE HYDROCHLORIDE 2 MG/ML
10 INJECTION, SOLUTION EPIDURAL; INFILTRATION; PERINEURAL ONCE
Status: COMPLETED | OUTPATIENT
Start: 2017-09-15 | End: 2017-09-15

## 2017-09-15 RX ORDER — ACETAMINOPHEN 325 MG/1
975 TABLET ORAL EVERY 8 HOURS
Status: DISCONTINUED | OUTPATIENT
Start: 2017-09-15 | End: 2017-09-18 | Stop reason: HOSPADM

## 2017-09-15 RX ORDER — ONDANSETRON 2 MG/ML
INJECTION INTRAMUSCULAR; INTRAVENOUS PRN
Status: DISCONTINUED | OUTPATIENT
Start: 2017-09-15 | End: 2017-09-15

## 2017-09-15 RX ORDER — LIDOCAINE HYDROCHLORIDE AND EPINEPHRINE 15; 5 MG/ML; UG/ML
INJECTION, SOLUTION EPIDURAL PRN
Status: DISCONTINUED | OUTPATIENT
Start: 2017-09-15 | End: 2017-09-16 | Stop reason: HOSPADM

## 2017-09-15 RX ORDER — OXYTOCIN/0.9 % SODIUM CHLORIDE 30/500 ML
100-340 PLASTIC BAG, INJECTION (ML) INTRAVENOUS CONTINUOUS PRN
Status: DISCONTINUED | OUTPATIENT
Start: 2017-09-15 | End: 2017-09-16

## 2017-09-15 RX ORDER — DIPHENHYDRAMINE HCL 25 MG
25 CAPSULE ORAL EVERY 6 HOURS PRN
Status: DISCONTINUED | OUTPATIENT
Start: 2017-09-15 | End: 2017-09-18 | Stop reason: HOSPADM

## 2017-09-15 RX ORDER — OXYTOCIN/0.9 % SODIUM CHLORIDE 30/500 ML
340 PLASTIC BAG, INJECTION (ML) INTRAVENOUS CONTINUOUS PRN
Status: DISCONTINUED | OUTPATIENT
Start: 2017-09-15 | End: 2017-09-18 | Stop reason: HOSPADM

## 2017-09-15 RX ORDER — NALOXONE HYDROCHLORIDE 0.4 MG/ML
.1-.4 INJECTION, SOLUTION INTRAMUSCULAR; INTRAVENOUS; SUBCUTANEOUS
Status: DISCONTINUED | OUTPATIENT
Start: 2017-09-15 | End: 2017-09-18 | Stop reason: HOSPADM

## 2017-09-15 RX ORDER — DIPHENHYDRAMINE HYDROCHLORIDE 50 MG/ML
25 INJECTION INTRAMUSCULAR; INTRAVENOUS EVERY 6 HOURS PRN
Status: DISCONTINUED | OUTPATIENT
Start: 2017-09-15 | End: 2017-09-18 | Stop reason: HOSPADM

## 2017-09-15 RX ORDER — PROPOFOL 10 MG/ML
INJECTION, EMULSION INTRAVENOUS PRN
Status: DISCONTINUED | OUTPATIENT
Start: 2017-09-15 | End: 2017-09-15

## 2017-09-15 RX ORDER — CARBOPROST TROMETHAMINE 250 UG/ML
250 INJECTION, SOLUTION INTRAMUSCULAR
Status: DISCONTINUED | OUTPATIENT
Start: 2017-09-15 | End: 2017-09-16

## 2017-09-15 RX ORDER — HYDROMORPHONE HYDROCHLORIDE 1 MG/ML
INJECTION, SOLUTION INTRAMUSCULAR; INTRAVENOUS; SUBCUTANEOUS
Status: COMPLETED
Start: 2017-09-15 | End: 2017-09-15

## 2017-09-15 RX ORDER — METHYLERGONOVINE MALEATE 0.2 MG/ML
200 INJECTION INTRAVENOUS
Status: DISCONTINUED | OUTPATIENT
Start: 2017-09-15 | End: 2017-09-16

## 2017-09-15 RX ORDER — METOCLOPRAMIDE HYDROCHLORIDE 5 MG/ML
INJECTION INTRAMUSCULAR; INTRAVENOUS PRN
Status: DISCONTINUED | OUTPATIENT
Start: 2017-09-15 | End: 2017-09-15

## 2017-09-15 RX ORDER — LIDOCAINE HYDROCHLORIDE AND EPINEPHRINE 15; 5 MG/ML; UG/ML
3 INJECTION, SOLUTION EPIDURAL
Status: DISCONTINUED | OUTPATIENT
Start: 2017-09-15 | End: 2017-09-16

## 2017-09-15 RX ORDER — ONDANSETRON 2 MG/ML
4 INJECTION INTRAMUSCULAR; INTRAVENOUS EVERY 6 HOURS PRN
Status: DISCONTINUED | OUTPATIENT
Start: 2017-09-15 | End: 2017-09-16

## 2017-09-15 RX ORDER — EPHEDRINE SULFATE 50 MG/ML
5 INJECTION, SOLUTION INTRAMUSCULAR; INTRAVENOUS; SUBCUTANEOUS
Status: DISCONTINUED | OUTPATIENT
Start: 2017-09-15 | End: 2017-09-16

## 2017-09-15 RX ORDER — LIDOCAINE HCL/EPINEPHRINE/PF 2%-1:200K
VIAL (ML) INJECTION PRN
Status: DISCONTINUED | OUTPATIENT
Start: 2017-09-15 | End: 2017-09-15

## 2017-09-15 RX ORDER — OXYTOCIN 10 [USP'U]/ML
10 INJECTION, SOLUTION INTRAMUSCULAR; INTRAVENOUS
Status: DISCONTINUED | OUTPATIENT
Start: 2017-09-15 | End: 2017-09-16

## 2017-09-15 RX ORDER — ONDANSETRON 2 MG/ML
INJECTION INTRAMUSCULAR; INTRAVENOUS
Status: DISCONTINUED
Start: 2017-09-15 | End: 2017-09-15 | Stop reason: HOSPADM

## 2017-09-15 RX ORDER — CEFAZOLIN SODIUM 2 G/100ML
2 INJECTION, SOLUTION INTRAVENOUS
Status: COMPLETED | OUTPATIENT
Start: 2017-09-15 | End: 2017-09-15

## 2017-09-15 RX ORDER — KETOROLAC TROMETHAMINE 30 MG/ML
15 INJECTION, SOLUTION INTRAMUSCULAR; INTRAVENOUS EVERY 6 HOURS
Status: DISPENSED | OUTPATIENT
Start: 2017-09-15 | End: 2017-09-16

## 2017-09-15 RX ORDER — ACETAMINOPHEN 325 MG/1
650 TABLET ORAL EVERY 4 HOURS PRN
Status: DISCONTINUED | OUTPATIENT
Start: 2017-09-18 | End: 2017-09-18 | Stop reason: HOSPADM

## 2017-09-15 RX ORDER — OXYTOCIN/0.9 % SODIUM CHLORIDE 30/500 ML
PLASTIC BAG, INJECTION (ML) INTRAVENOUS
Status: DISCONTINUED
Start: 2017-09-15 | End: 2017-09-15 | Stop reason: HOSPADM

## 2017-09-15 RX ORDER — OXYTOCIN/0.9 % SODIUM CHLORIDE 30/500 ML
100 PLASTIC BAG, INJECTION (ML) INTRAVENOUS CONTINUOUS
Status: DISCONTINUED | OUTPATIENT
Start: 2017-09-15 | End: 2017-09-18 | Stop reason: HOSPADM

## 2017-09-15 RX ORDER — OXYTOCIN 10 [USP'U]/ML
10 INJECTION, SOLUTION INTRAMUSCULAR; INTRAVENOUS
Status: DISCONTINUED | OUTPATIENT
Start: 2017-09-15 | End: 2017-09-18 | Stop reason: HOSPADM

## 2017-09-15 RX ORDER — POTASSIUM CHLORIDE 1.5 G/1.58G
20 POWDER, FOR SOLUTION ORAL DAILY
COMMUNITY
End: 2019-10-11

## 2017-09-15 RX ORDER — OXYCODONE AND ACETAMINOPHEN 5; 325 MG/1; MG/1
1 TABLET ORAL
Status: DISCONTINUED | OUTPATIENT
Start: 2017-09-15 | End: 2017-09-16

## 2017-09-15 RX ORDER — ACETAMINOPHEN 325 MG/1
TABLET ORAL
Status: DISCONTINUED
Start: 2017-09-15 | End: 2017-09-16 | Stop reason: HOSPADM

## 2017-09-15 RX ORDER — SODIUM CHLORIDE, SODIUM LACTATE, POTASSIUM CHLORIDE, CALCIUM CHLORIDE 600; 310; 30; 20 MG/100ML; MG/100ML; MG/100ML; MG/100ML
INJECTION, SOLUTION INTRAVENOUS CONTINUOUS
Status: DISCONTINUED | OUTPATIENT
Start: 2017-09-15 | End: 2017-09-16 | Stop reason: HOSPADM

## 2017-09-15 RX ORDER — KETOROLAC TROMETHAMINE 30 MG/ML
INJECTION, SOLUTION INTRAMUSCULAR; INTRAVENOUS
Status: COMPLETED
Start: 2017-09-15 | End: 2017-09-15

## 2017-09-15 RX ORDER — OXYTOCIN/0.9 % SODIUM CHLORIDE 30/500 ML
PLASTIC BAG, INJECTION (ML) INTRAVENOUS CONTINUOUS PRN
Status: DISCONTINUED | OUTPATIENT
Start: 2017-09-15 | End: 2017-09-15

## 2017-09-15 RX ORDER — OXYCODONE HYDROCHLORIDE 5 MG/1
5-10 TABLET ORAL
Status: DISCONTINUED | OUTPATIENT
Start: 2017-09-15 | End: 2017-09-18 | Stop reason: HOSPADM

## 2017-09-15 RX ORDER — METOCLOPRAMIDE HYDROCHLORIDE 5 MG/ML
INJECTION INTRAMUSCULAR; INTRAVENOUS
Status: DISCONTINUED
Start: 2017-09-15 | End: 2017-09-16 | Stop reason: HOSPADM

## 2017-09-15 RX ORDER — HYDROCORTISONE 2.5 %
CREAM (GRAM) TOPICAL 3 TIMES DAILY PRN
Status: DISCONTINUED | OUTPATIENT
Start: 2017-09-15 | End: 2017-09-18 | Stop reason: HOSPADM

## 2017-09-15 RX ORDER — ACETAMINOPHEN 325 MG/1
650 TABLET ORAL EVERY 4 HOURS PRN
Status: DISCONTINUED | OUTPATIENT
Start: 2017-09-15 | End: 2017-09-16

## 2017-09-15 RX ORDER — PROPOFOL 10 MG/ML
INJECTION, EMULSION INTRAVENOUS
Status: DISCONTINUED
Start: 2017-09-15 | End: 2017-09-16 | Stop reason: HOSPADM

## 2017-09-15 RX ORDER — FENTANYL CITRATE 50 UG/ML
INJECTION, SOLUTION INTRAMUSCULAR; INTRAVENOUS
Status: DISCONTINUED
Start: 2017-09-15 | End: 2017-09-15 | Stop reason: HOSPADM

## 2017-09-15 RX ORDER — HYDROMORPHONE HYDROCHLORIDE 1 MG/ML
.3-.5 INJECTION, SOLUTION INTRAMUSCULAR; INTRAVENOUS; SUBCUTANEOUS EVERY 30 MIN PRN
Status: DISCONTINUED | OUTPATIENT
Start: 2017-09-15 | End: 2017-09-18 | Stop reason: HOSPADM

## 2017-09-15 RX ORDER — METHYLERGONOVINE MALEATE 0.2 MG/ML
INJECTION INTRAVENOUS PRN
Status: DISCONTINUED | OUTPATIENT
Start: 2017-09-15 | End: 2017-09-15

## 2017-09-15 RX ORDER — CARBOPROST TROMETHAMINE 250 UG/ML
INJECTION, SOLUTION INTRAMUSCULAR
Status: DISCONTINUED
Start: 2017-09-15 | End: 2017-09-16 | Stop reason: HOSPADM

## 2017-09-15 RX ORDER — MISOPROSTOL 200 UG/1
400 TABLET ORAL
Status: DISCONTINUED | OUTPATIENT
Start: 2017-09-15 | End: 2017-09-18 | Stop reason: HOSPADM

## 2017-09-15 RX ORDER — LANOLIN 100 %
OINTMENT (GRAM) TOPICAL
Status: DISCONTINUED | OUTPATIENT
Start: 2017-09-15 | End: 2017-09-18 | Stop reason: HOSPADM

## 2017-09-15 RX ORDER — SODIUM CHLORIDE, SODIUM LACTATE, POTASSIUM CHLORIDE, CALCIUM CHLORIDE 600; 310; 30; 20 MG/100ML; MG/100ML; MG/100ML; MG/100ML
INJECTION, SOLUTION INTRAVENOUS CONTINUOUS
Status: DISCONTINUED | OUTPATIENT
Start: 2017-09-15 | End: 2017-09-16

## 2017-09-15 RX ADMIN — ROPIVACAINE HYDROCHLORIDE 9 ML: 2 INJECTION, SOLUTION EPIDURAL; INFILTRATION at 10:30

## 2017-09-15 RX ADMIN — SODIUM CHLORIDE, POTASSIUM CHLORIDE, SODIUM LACTATE AND CALCIUM CHLORIDE: 600; 310; 30; 20 INJECTION, SOLUTION INTRAVENOUS at 22:47

## 2017-09-15 RX ADMIN — FENTANYL CITRATE 100 MCG: 50 INJECTION, SOLUTION INTRAMUSCULAR; INTRAVENOUS at 22:20

## 2017-09-15 RX ADMIN — ONDANSETRON 4 MG: 2 SOLUTION INTRAMUSCULAR; INTRAVENOUS at 14:39

## 2017-09-15 RX ADMIN — OXYTOCIN-SODIUM CHLORIDE 0.9% IV SOLN 30 UNIT/500ML 2 MILLI-UNITS/MIN: 30-0.9/5 SOLUTION at 08:37

## 2017-09-15 RX ADMIN — Medication 12 ML/HR: at 10:32

## 2017-09-15 RX ADMIN — KETOROLAC TROMETHAMINE 15 MG: 30 INJECTION, SOLUTION INTRAMUSCULAR at 23:48

## 2017-09-15 RX ADMIN — PROPOFOL 20 MG: 10 INJECTION, EMULSION INTRAVENOUS at 22:54

## 2017-09-15 RX ADMIN — LIDOCAINE HYDROCHLORIDE AND EPINEPHRINE 3 ML: 15; 5 INJECTION, SOLUTION EPIDURAL at 10:27

## 2017-09-15 RX ADMIN — Medication 5 MG: at 10:40

## 2017-09-15 RX ADMIN — SODIUM CHLORIDE, POTASSIUM CHLORIDE, SODIUM LACTATE AND CALCIUM CHLORIDE: 600; 310; 30; 20 INJECTION, SOLUTION INTRAVENOUS at 08:36

## 2017-09-15 RX ADMIN — SODIUM CHLORIDE, POTASSIUM CHLORIDE, SODIUM LACTATE AND CALCIUM CHLORIDE: 600; 310; 30; 20 INJECTION, SOLUTION INTRAVENOUS at 22:32

## 2017-09-15 RX ADMIN — SODIUM CHLORIDE, POTASSIUM CHLORIDE, SODIUM LACTATE AND CALCIUM CHLORIDE 1000 ML: 600; 310; 30; 20 INJECTION, SOLUTION INTRAVENOUS at 22:21

## 2017-09-15 RX ADMIN — METOCLOPRAMIDE HYDROCHLORIDE 10 MG: 5 INJECTION INTRAMUSCULAR; INTRAVENOUS at 22:55

## 2017-09-15 RX ADMIN — ONDANSETRON 4 MG: 2 INJECTION INTRAMUSCULAR; INTRAVENOUS at 22:35

## 2017-09-15 RX ADMIN — LIDOCAINE HYDROCHLORIDE,EPINEPHRINE BITARTRATE 5 ML: 20; .005 INJECTION, SOLUTION EPIDURAL; INFILTRATION; INTRACAUDAL; PERINEURAL at 22:35

## 2017-09-15 RX ADMIN — Medication 340 ML/HR: at 22:51

## 2017-09-15 RX ADMIN — HYDROMORPHONE HYDROCHLORIDE 0.5 MG: 1 INJECTION, SOLUTION INTRAMUSCULAR; INTRAVENOUS; SUBCUTANEOUS at 23:48

## 2017-09-15 RX ADMIN — ACETAMINOPHEN 650 MG: 325 TABLET, FILM COATED ORAL at 16:06

## 2017-09-15 RX ADMIN — SODIUM CHLORIDE, POTASSIUM CHLORIDE, SODIUM LACTATE AND CALCIUM CHLORIDE: 600; 310; 30; 20 INJECTION, SOLUTION INTRAVENOUS at 16:55

## 2017-09-15 RX ADMIN — SODIUM CHLORIDE, POTASSIUM CHLORIDE, SODIUM LACTATE AND CALCIUM CHLORIDE: 600; 310; 30; 20 INJECTION, SOLUTION INTRAVENOUS at 10:23

## 2017-09-15 RX ADMIN — FENTANYL CITRATE 100 MCG: 50 INJECTION, SOLUTION INTRAMUSCULAR; INTRAVENOUS at 10:30

## 2017-09-15 RX ADMIN — SODIUM CITRATE AND CITRIC ACID MONOHYDRATE 30 ML: 500; 334 SOLUTION ORAL at 22:22

## 2017-09-15 RX ADMIN — CEFAZOLIN SODIUM 2 G: 2 INJECTION, SOLUTION INTRAVENOUS at 22:35

## 2017-09-15 RX ADMIN — KETOROLAC TROMETHAMINE 15 MG: 30 INJECTION, SOLUTION INTRAMUSCULAR; INTRAVENOUS at 23:48

## 2017-09-15 RX ADMIN — LIDOCAINE HYDROCHLORIDE,EPINEPHRINE BITARTRATE 9 ML: 20; .005 INJECTION, SOLUTION EPIDURAL; INFILTRATION; INTRACAUDAL; PERINEURAL at 22:29

## 2017-09-15 RX ADMIN — METHYLERGONOVINE MALEATE 200 MCG: 0.2 INJECTION INTRAMUSCULAR; INTRAVENOUS at 22:56

## 2017-09-15 NOTE — PROVIDER NOTIFICATION
Dr Magana notified regarding pt increased discomfort.  Pt states she is starting to feel more abdominal pain.  Epidural rate increase to 12ml/hr per Dr Magana.  Pt instructed to call is she does not have pain relief or if she has numbness/ tingling in her upper chest, arms or hands.  Will continue to monitor.

## 2017-09-15 NOTE — IP AVS SNAPSHOT
MRN:[id number]                      After Visit Summary   9/15/2017    Ashley Cordon    MRN: [id number]           Thank you!     Thank you for choosing [facility] for your care. Our goal is always to provide you with excellent care. Hearing back from our patients is one way we can continue to improve our services. Please take a few minutes to complete the written survey that you may receive in the mail after you visit with us. Thank you!        Patient Information     Date Of Birth          1990        About your hospital stay     You were admitted on:  September 15, 2017 You last received care in the:  [address]    You were discharged on:  2017       Who to Call     For medical emergencies, please call 911.  For non-urgent questions about your medical care, please call your primary care provider or clinic, [phone]  For questions related to your surgery, please call your surgery clinic        Attending Provider     Provider Specialty    Katherine Douglass MD OB/Gyn       Primary Care Provider Office Phone # Fax #    Rocío Wilkinson [phone]      Further instructions from your care team       Postop  Birth Instructions    Activity       Do not lift more than 10 pounds for 6 weeks after surgery.  Ask family and friends for help when you need it.    No driving until you have stopped taking your pain medications (usually two weeks after surgery).    No heavy exercise or activity for 6 weeks.  Don't do anything that will put a strain on your surgery site.    Don't strain when using the toilet.  Your care team may prescribe a stool softener if you have problems with your bowel movements.     To care for your incision:       Keep the incision clean and dry.    Do not soak your incision in water. No swimming or hot tubs until it has fully healed. You may soak in the bathtub if the water level is below your  incision.    Do not use peroxide, gel, cream, lotion, or ointment on your incision.    Adjust your clothes to avoid pressure on your surgery site (check the elastic in your underwear for example).     You may see a small amount of clear or pink drainage and this is normal.  Check with your health care provider:       If the drainage increases or has an odor.    If the incision reddens, you have swelling, or develop a rash.    If you have increased pain and the medicine we prescribed doesn't help.    If you have a fever above 100.4 F (38 C) with or without chills when placing thermometer under your tongue.   The area around your incision (surgery wound), will feel numb.  This is normal. The numbness should go away in less than a year.     Keep your hands clean:  Always wash your hands before touching your incision (surgery wound). This helps reduce your risk of infection. If your hands aren't dirty, you may use an alcohol hand-rub to clean your hands. Keep your nails clean and short.    Call your healthcare provider if you have any of these symptoms:       You soak a sanitary pad with blood within 1 hour, or you see blood clots larger than a golf ball.    Bleeding that lasts more than 6 weeks.    Vaginal discharge that smells bad.    Severe pain, cramping or tenderness in your lower belly area.    A need to urinate more frequently (use the toilet more often), more urgently (use the toilet very quickly), or it burns when you urinate.    Nausea and vomiting.    Redness, swelling or pain around a vein in your leg.    Problems breastfeeding or a red or painful area on your breast.    Chest pain and cough or are gasping for air.    Problems with coping with sadness, anxiety or depression. If you have concerns about hurting yourself or the baby, call your provider immediately.      You have questions or concerns after you return home.                  Pending Results     Date and Time Order Name Status Description     "9/15/2017 2322 Placenta Path Order and Indications (PLACENTA) In process     9/15/2017 1225 ABO/Rh type and screen In process             Statement of Approval     Ordered          17 0832  I have reviewed and agree with all the recommendations and orders detailed in this document.  EFFECTIVE NOW     Approved and electronically signed by:  Katherine Douglass MD             Admission Information     Date & Time Provider Department Dept. Phone    9/15/2017 Katherine Douglass MD [address] [phone]      Your Vitals Were     Blood Pressure Pulse Temperature Respirations Height Weight    108/65 (BP Location: Right arm) 93 97.8  F (36.6  C) (Oral) 16 1.549 m (5' 1\") 67.1 kg (148 lb)    Last Period Pulse Oximetry BMI (Body Mass Index)             2016 96% 27.96 kg/m2         [facility] Information     [employer] lets you send messages to your doctor, view your test results, renew your prescriptions, schedule appointments and more. To sign up, go to [email or web address] . Click on \"Log in\" on the left side of the screen, which will take you to the Welcome page. Then click on \"Sign up Now\" on the right side of the page.     You will be asked to enter the access code listed below, as well as some personal information. Please follow the directions to create your username and password.     Your access code is: [email or web address]  Expires: 2017  9:09 AM     Your access code will  in 90 days. If you need help or a new code, please call your [facility] or [phone].        Care EveryWhere ID     This is your Care EveryWhere ID. This could be used by other organizations to access your [facility] medical records  [id number]        Equal Access to Services     TRINY SAUER : Laine Murcia, wahayleeda luqadaha, qaybta kaalmada miguel, elier sanchez. So [facility] [phone].    ATENCIÓN: Si johnathon diamond " velasco disposición servicios gratuitos de asistencia lingüística. Ronaldo washington [phone].    We comply with applicable federal civil rights laws and [place] laws. We do not discriminate on the basis of race, color, national origin, age, disability sex, sexual orientation or gender identity.               Review of your medicines      START taking        Dose / Directions    ibuprofen 400 MG tablet   Commonly known as:  ADVIL/MOTRIN        Dose:  400-800 mg   Take 1-2 tablets (400-800 mg) by mouth every 6 hours as needed for other (cramping)   Quantity:  30 tablet   Refills:  0       oxyCODONE 5 MG IR tablet   Commonly known as:  ROXICODONE        Dose:  5-10 mg   Take 1-2 tablets (5-10 mg) by mouth every 3 hours as needed for moderate to severe pain   Quantity:  30 tablet   Refills:  0         CONTINUE these medicines which have NOT CHANGED        Dose / Directions    potassium chloride 20 MEQ Packet   Commonly known as:  KLOR-CON   Indication:  Low Amount of Potassium in the Blood        Dose:  20 mEq   Take 20 mEq by mouth daily   Refills:  0       prenatal multivitamin plus iron 27-0.8 MG Tabs per tablet        Dose:  1 tablet   Take 1 tablet by mouth daily   Refills:  0       vitamin B complex with vitamin C Tabs tablet        Dose:  1 tablet   Take 1 tablet by mouth daily   Refills:  0            Where to get your medicines      Some of these will need a paper prescription and others can be bought over the counter. Ask your nurse if you have questions.     Bring a paper prescription for each of these medications     ibuprofen 400 MG tablet    oxyCODONE 5 MG IR tablet                Protect others around you: Learn how to safely use, store and throw away your medicines at www.disposemymeds.org.             Medication List: This is a list of all your medications and when to take them. Check marks below indicate your daily home schedule. Keep this list as a reference.      Medications           Morning Afternoon  Evening Bedtime As Needed    ibuprofen 400 MG tablet   Commonly known as:  ADVIL/MOTRIN   Take 1-2 tablets (400-800 mg) by mouth every 6 hours as needed for other (cramping)   Last time this was given:  800 mg on 9/18/2017  8:18 AM                                oxyCODONE 5 MG IR tablet   Commonly known as:  ROXICODONE   Take 1-2 tablets (5-10 mg) by mouth every 3 hours as needed for moderate to severe pain   Last time this was given:  5 mg on 9/18/2017  8:19 AM                                potassium chloride 20 MEQ Packet   Commonly known as:  KLOR-CON   Take 20 mEq by mouth daily                                prenatal multivitamin plus iron 27-0.8 MG Tabs per tablet   Take 1 tablet by mouth daily                                vitamin B complex with vitamin C Tabs tablet   Take 1 tablet by mouth daily

## 2017-09-15 NOTE — IP AVS SNAPSHOT
[address]    [place] [address]    Phone:  [phone]                                       After Visit Summary   9/15/2017    Ashley Cordon    MRN: [id number]           After Visit Summary Signature Page     I have received my discharge instructions, and my questions have been answered. I have discussed any challenges I see with this plan with the nurse or doctor.    ..........................................................................................................................................  Patient/Patient Representative Signature      ..........................................................................................................................................  Patient Representative Print Name and Relationship to Patient    ..................................................               ................................................  Date                                            Time    ..........................................................................................................................................  Reviewed by Signature/Title    ...................................................              ..............................................  Date                                                            Time

## 2017-09-15 NOTE — ANESTHESIA PREPROCEDURE EVALUATION
Anesthesia Evaluation     .             ROS/MED HX    ENT/Pulmonary:  - neg pulmonary ROS     Neurologic:  - neg neurologic ROS     Cardiovascular:  - neg cardiovascular ROS       METS/Exercise Tolerance:     Hematologic:         Musculoskeletal:         GI/Hepatic:        (-) GERD   Renal/Genitourinary:         Endo:         Psychiatric:         Infectious Disease:         Malignancy:         Other:                     Physical Exam      Airway   Mallampati: II  TM distance: > 3 FB  Neck ROM: full  Mouth opening: > 3 cm    Dental     Cardiovascular       Pulmonary           neg OB ROS                 Anesthesia Plan      History & Physical Review  History and physical reviewed and following examination; no interval change.    ASA Status:  2 .  OB Epidural Asa: 2       Plan for Epidural          Postoperative Care      Consents  Anesthetic plan, risks, benefits and alternatives discussed with:  Patient and Patient..                          .

## 2017-09-15 NOTE — PROVIDER NOTIFICATION
09/15/17 1730   Provider Notification   Provider Name/Title Dr. Jorge    Method of Notification Electronic Page   Request Evaluate - Remote   Notification Reason SVE;Status Update;Labor Status

## 2017-09-15 NOTE — PROVIDER NOTIFICATION
09/15/17 1720   Provider Notification   Provider Name/Title Dr. Jorge   Method of Notification Electronic Page   Request Evaluate - Remote   Notification Reason SVE;Status Update;Labor Status

## 2017-09-15 NOTE — PROVIDER NOTIFICATION
09/15/17 1845   Provider Notification   Provider Name/Title Dr. Jorge   Method of Notification Electronic Page   Request Evaluate - Remote   Notification Reason Status Update;Uterine Activity;Labor Status   Notified Dr. Jorge of trial pushing. Will continue to labor down. Pitocin increased to 12 milliunits

## 2017-09-15 NOTE — PLAN OF CARE
1020- Dr Magana at bedside for epidural procedure.  Time out performed and consent signed.  Pt sitting on edge of bed.  1028-Pt in left tilt.  Tolerated procedure well and pump started at 1032.  1040- Pt given 5 mg of ephedrine for low BP and states she is feeling nauseated.    1042- IV bolus started  1047- pt stated that she is feeling numb up to the upper part of her chest, arms and hands.  Dr Magana called and epidural infusion stopped.  1050- Dr Magana at bedside to evaluate pt.    1106- Dr Jorge updated on pt status.  1130-  Pt states there is no numbness in arms, hands and chest.  Epidural pump restarted at 8ml/hr.  Pt denies pain at this time.

## 2017-09-15 NOTE — H&P
Ashley Cordon is an 27 year old female.  History of infertility and shortened cervix in pregnancy    No past medical history on file.    Allergies: No Known Allergies    Active Problems:    * No active hospital problems. *    Last menstrual period 12/06/2016.    Review of Systems   All other systems reviewed and are negative.      Physical Exam   Constitutional: She appears well-developed and well-nourished.   HENT:   Head: Normocephalic and atraumatic.   Neck: Normal range of motion. Neck supple.   Cardiovascular: Normal rate and regular rhythm.    Pulmonary/Chest: Effort normal and breath sounds normal.       Assessment:  Favorable solis score, desire for induction    Plan:  arom  keanu Morgan  9/15/2017

## 2017-09-15 NOTE — PLAN OF CARE
Pt admitted to 213 for elective induction.  Monitors applied after obtaining verbal consent from the pt.  Pt and spouse oriented to room.  Dr Jorge at bedside to AROM.  Clear, moderate amount of fluid.  IV started and pitocin began at 0830.  Category 1 tracing.  No concerns at this time, will continue to monitor.

## 2017-09-15 NOTE — ANESTHESIA PROCEDURE NOTES
Peripheral nerve/Neuraxial procedure note : epidural catheter  Pre-Procedure  Performed by TENZIN MERCEDES  Location: OB      Pre-Anesthestic Checklist: patient identified, IV checked, risks and benefits discussed, informed consent and pre-op evaluation    Timeout  Correct Patient: Yes   Correct Procedure: Yes   Correct Site: Yes   Correct Laterality: N/A   Correct Position: Yes   Site Marked: N/A   .   Procedure Documentation    .    Procedure:    Epidural catheter.  Insertion Site:L3-4  (midline approach) Injection technique: LORT saline   Local skin infiltrated with mL of 1% lidocaine.  JAVIER at 5 cm     Patient Prep;mask, sterile gloves, povidone-iodine 7.5% surgical scrub, patient draped.  .  Needle: ToNet Orangey needle Needle Gauge: 17.    Needle Length (Inches) 3.5  # of attempts: 1 and # of redirects:  .   . .  Catheter threaded easily  4 cm epidural space.  8 cm at skin.   .    Assessment/Narrative  Paresthesias: No.  .  .  Aspiration negative for heme or CSF  . Test dose of 3 mL lidocaine 1.5% w/ 1:200,000 epinephrine at. Test dose negative for signs of intravascular, subdural or intrathecal injection. Comments:  Chambers JAVIER at 4 cm.  Catheter threaded easily.  Negative aspiration.  Negative test dose.  No abnormal pain or paresthesia throughout.  Patient tolerated well.

## 2017-09-16 LAB
ABO + RH BLD: NORMAL
BLD GP AB SCN SERPL QL: NORMAL
BLOOD BANK CMNT PATIENT-IMP: NORMAL
HGB BLD-MCNC: 11.5 G/DL (ref 11.7–15.7)
SPECIMEN EXP DATE BLD: NORMAL
SPECIMEN EXP DATE BLD: NORMAL

## 2017-09-16 PROCEDURE — 85018 HEMOGLOBIN: CPT | Performed by: OBSTETRICS & GYNECOLOGY

## 2017-09-16 PROCEDURE — 25000132 ZZH RX MED GY IP 250 OP 250 PS 637: Performed by: OBSTETRICS & GYNECOLOGY

## 2017-09-16 PROCEDURE — 25000128 H RX IP 250 OP 636: Performed by: OBSTETRICS & GYNECOLOGY

## 2017-09-16 PROCEDURE — 36415 COLL VENOUS BLD VENIPUNCTURE: CPT | Performed by: OBSTETRICS & GYNECOLOGY

## 2017-09-16 PROCEDURE — 25000125 ZZHC RX 250: Performed by: OBSTETRICS & GYNECOLOGY

## 2017-09-16 PROCEDURE — 12000037 ZZH R&B POSTPARTUM INTERMEDIATE

## 2017-09-16 RX ORDER — IBUPROFEN 400 MG/1
400-800 TABLET, FILM COATED ORAL EVERY 6 HOURS PRN
Qty: 30 TABLET | Refills: 0 | Status: SHIPPED | OUTPATIENT
Start: 2017-09-16 | End: 2020-04-29

## 2017-09-16 RX ORDER — OXYCODONE HYDROCHLORIDE 5 MG/1
5-10 TABLET ORAL
Qty: 30 TABLET | Refills: 0 | Status: SHIPPED | OUTPATIENT
Start: 2017-09-16 | End: 2019-10-11

## 2017-09-16 RX ADMIN — SENNOSIDES AND DOCUSATE SODIUM 1 TABLET: 8.6; 5 TABLET ORAL at 08:10

## 2017-09-16 RX ADMIN — OXYCODONE HYDROCHLORIDE 5 MG: 5 TABLET ORAL at 23:51

## 2017-09-16 RX ADMIN — OXYCODONE HYDROCHLORIDE 5 MG: 5 TABLET ORAL at 15:35

## 2017-09-16 RX ADMIN — IBUPROFEN 800 MG: 400 TABLET ORAL at 23:51

## 2017-09-16 RX ADMIN — SIMETHICONE CHEW TAB 80 MG 80 MG: 80 TABLET ORAL at 11:43

## 2017-09-16 RX ADMIN — ACETAMINOPHEN 975 MG: 325 TABLET, FILM COATED ORAL at 00:54

## 2017-09-16 RX ADMIN — ACETAMINOPHEN 975 MG: 325 TABLET, FILM COATED ORAL at 08:09

## 2017-09-16 RX ADMIN — ACETAMINOPHEN 975 MG: 325 TABLET, FILM COATED ORAL at 23:51

## 2017-09-16 RX ADMIN — SIMETHICONE CHEW TAB 80 MG 80 MG: 80 TABLET ORAL at 18:02

## 2017-09-16 RX ADMIN — SODIUM CHLORIDE, SODIUM LACTATE, POTASSIUM CHLORIDE, CALCIUM CHLORIDE, AND DEXTROSE MONOHYDRATE: 600; 310; 30; 20; 5 INJECTION, SOLUTION INTRAVENOUS at 07:24

## 2017-09-16 RX ADMIN — OXYCODONE HYDROCHLORIDE 5 MG: 5 TABLET ORAL at 18:26

## 2017-09-16 RX ADMIN — SENNOSIDES AND DOCUSATE SODIUM 1 TABLET: 8.6; 5 TABLET ORAL at 21:10

## 2017-09-16 RX ADMIN — OXYCODONE HYDROCHLORIDE 5 MG: 5 TABLET ORAL at 11:42

## 2017-09-16 RX ADMIN — KETOROLAC TROMETHAMINE 15 MG: 30 INJECTION, SOLUTION INTRAMUSCULAR at 05:12

## 2017-09-16 RX ADMIN — KETOROLAC TROMETHAMINE 15 MG: 30 INJECTION, SOLUTION INTRAMUSCULAR at 11:46

## 2017-09-16 RX ADMIN — IBUPROFEN 800 MG: 400 TABLET ORAL at 18:00

## 2017-09-16 RX ADMIN — OXYTOCIN-SODIUM CHLORIDE 0.9% IV SOLN 30 UNIT/500ML 100 ML/HR: 30-0.9/5 SOLUTION at 02:05

## 2017-09-16 RX ADMIN — HYDROMORPHONE HYDROCHLORIDE 0.5 MG: 1 INJECTION, SOLUTION INTRAMUSCULAR; INTRAVENOUS; SUBCUTANEOUS at 03:15

## 2017-09-16 RX ADMIN — OXYCODONE HYDROCHLORIDE 5 MG: 5 TABLET ORAL at 21:10

## 2017-09-16 RX ADMIN — HYDROMORPHONE HYDROCHLORIDE 0.5 MG: 1 INJECTION, SOLUTION INTRAMUSCULAR; INTRAVENOUS; SUBCUTANEOUS at 02:05

## 2017-09-16 RX ADMIN — ACETAMINOPHEN 975 MG: 325 TABLET, FILM COATED ORAL at 15:35

## 2017-09-16 NOTE — ANESTHESIA CARE TRANSFER NOTE
Patient: Ashley Cordon    Procedure(s):   - Wound Class: II-Clean Contaminated    Diagnosis: pregnancy  Diagnosis Additional Information: No value filed.    Anesthesia Type:   Epidural     Note:      Comments:   Transferred to  PACU RN. Patient awake and verbal. Spontaneous resp and on room air. Monitors and alarms on. VSS. Report given.      Vitals: (Last set prior to Anesthesia Care Transfer)    CRNA VITALS  9/15/2017 2258 - 9/15/2017 2339      9/15/2017             Resp Rate (set): 10                Electronically Signed By: LITTLE Goldberg CRNA  September 15, 2017  11:39 PM

## 2017-09-16 NOTE — OP NOTE
Section Operative Note    Ashley Cordon  September 15, 2017    Pre-operative Diagnosis:   1.  at 40w3d  2. S/p elective induction of labor  3. Complete dilation, arrest of descent       Post-operative Diagnosis: Same    Procedure done: Primary LTCS    Surgeon: Katherine Jorge MD    Anesthesia: combined spinal-epidural    Complications:  None    EBL: 700 ml    IV fluids- see anesthesia record    Drains- moreno catheter    Findings:  Infant female  Weight 7 lbs 7 oz   APGARS- 8/9  3-V cord  Normal tubes and ovaries.    Indications:   Patient is a 27 year old , who was admitted at 40w3d weeks pregnancy for elective induction of labor second to favorable solis score.   She showed adequate cervical change throughout the day.  She labored down and pushed for greater then two hours.  No decent was felt and the pt was consented for a pltcs.  R/b/a were discussed and all questions were answered.     Procedure Details:  IV antibiotics given per protocol.  SCD placed for VTE prophylaxis.  Spinal anesthesia administered, checked and found to be adequate.  Moreno catheter was in place.  The patient was draped and prepped in the usual sterile manner in the dorsal postion with a left tilt. A Pfannenstiel incision was made and carried down through the subcutaneous tissue to the fascia. Fascial incision was made and extended transversely.The fascia was  from the underlying rectus tissue superiorly and inferiorly. The peritoneum was identified and entered bluntly. Peritoneal incision was extended with careful visualization of bladder.  The bladder blade was inserted.   The utero-vesical peritoneal reflection was opened sharply and extended latteraly.  The bladder blade was then replaced.   Transverse incision made in the lower uterine segment above the bladder.    Infant delivered from the cephalic presentation.  Rest of the infant delivered without complications. The umbilical cord was clamped  and cut cord blood sampled.  The placenta was allowed to separate and expelled spontaneously with membranes. Sent for histopathology.  The uterus was well retracted.  Exteriorized.  Tubes and ovaries appeared normal.   The uterine incision was closed with running locked sutures of 0 vicryl. Blood vessel on right adnexa bleeding, ceased with bovie cautery.  Second layer of sutures used to imbricate the initial layer.  Hemostasis was observed. Uterus returned to abdomen.  Copious irrigation and suctioning of the peritoneal cavity was carried out. Para-colic gutters were cleared of all clots and debris.  Hysterotomy once again checked to ascertain hemostasis.  The fascia was closed with running sutures of 0 Vicryl.   Subcutaneous tissue re approximated with 3-0 vicryl.  The skin was closed in a subcuticular fashion with quill mono derm suture.  Instrument, sponge, and needle counts were correct x 3   Patient and the baby were returned to the recovery room in a stable condition.    Katherine Jorge MD

## 2017-09-16 NOTE — PLAN OF CARE
Patient admitted from L&D via bed with baby in arms at 0145.  ID bands double-checked with previous RN.  VSS.  FF at midline.  Oriented to unit and room.  Questions asked and answered.  Will continue to monitor.

## 2017-09-16 NOTE — PLAN OF CARE
Problem: Goal Outcome Summary  Goal: Goal Outcome Summary  Outcome: Improving  VSS.  Working on breastfeeding.  Tylenol, Toradol, and Dilaudid controlling pain.  FF @ midline.  Abdominal incision JADEN.  Robertson patent with adequate UOP.  Will continue to monitor.

## 2017-09-16 NOTE — ANESTHESIA PREPROCEDURE EVALUATION
Anesthesia Evaluation     .             ROS/MED HX    ENT/Pulmonary:      (-) sleep apnea   Neurologic:  - neg neurologic ROS     Cardiovascular:         METS/Exercise Tolerance:     Hematologic:  - neg hematologic  ROS       Musculoskeletal:  - neg musculoskeletal ROS       GI/Hepatic:     (+) GERD       Renal/Genitourinary:     (+) Other Renal/ Genitourinary, failure to progress with labor      Endo:         Psychiatric:  - neg psychiatric ROS       Infectious Disease:         Malignancy:         Other:                     Physical Exam  Normal systems: cardiovascular, pulmonary and dental    Airway   Mallampati: II  TM distance: >3 FB  Neck ROM: full    Dental     Cardiovascular       Pulmonary                     Anesthesia Plan      History & Physical Review  History and physical reviewed and following examination; no interval change.    ASA Status:  2 .    NPO Status:  > 8 hours    Plan for Epidural          Postoperative Care  Postoperative pain management:  Multi-modal analgesia.      Consents  Anesthetic plan, risks, benefits and alternatives discussed with:  Patient.  Use of blood products discussed: Yes.   Use of blood products discussed with Patient.  .          Procedure: Procedure(s):   SECTION  Preop diagnosis: pregnancy    No Known Allergies  History reviewed. No pertinent past medical history.  Past Surgical History:   Procedure Laterality Date     D & C  ,      Prior to Admission medications    Medication Sig Start Date End Date Taking? Authorizing Provider   potassium chloride (KLOR-CON) 20 MEQ Packet Take 20 mEq by mouth daily   Yes Reported, Patient   Prenatal Vit-Fe Fumarate-FA (PRENATAL MULTIVITAMIN  PLUS IRON) 27-0.8 MG TABS per tablet Take 1 tablet by mouth daily   Yes Reported, Patient   vitamin B complex with vitamin C (VITAMIN  B COMPLEX) TABS tablet Take 1 tablet by mouth daily   Yes Reported, Patient     Current Facility-Administered Medications Ordered in Epic  "  Medication Dose Route Frequency Last Rate Last Dose     lidocaine 1 % 1 mL  1 mL Other Q1H PRN         lidocaine (LMX4) cream   Topical Q1H PRN         sodium chloride (PF) 0.9% PF flush 3 mL  3 mL Intracatheter Q1H PRN         sodium chloride (PF) 0.9% PF flush 3 mL  3 mL Intracatheter Q8H         oxytocin (PITOCIN) 30 units in 500 mL 0.9% NaCl infusion  1-24 daniel-units/min Intravenous Continuous 12 mL/hr at 09/15/17 1840 12 daniel-units/min at 09/15/17 1840     lactated ringers infusion   Intravenous Continuous 125 mL/hr at 09/15/17 1655       lactated ringers BOLUS 500 mL  500 mL Intravenous Once PRN         lactated ringers BOLUS 1,000 mL  1,000 mL Intravenous Once PRN   1,000 mL at 09/15/17 0940    Or     lactated ringers BOLUS 500 mL  500 mL Intravenous Once PRN         acetaminophen (TYLENOL) tablet 650 mg  650 mg Oral Q4H PRN   650 mg at 09/15/17 1606     naloxone (NARCAN) injection 0.1-0.4 mg  0.1-0.4 mg Intravenous Q2 Min PRN         ondansetron (ZOFRAN) injection 4 mg  4 mg Intravenous Q6H PRN   4 mg at 09/15/17 1439     oxytocin (PITOCIN) injection 10 Units  10 Units Intramuscular Once PRN         oxytocin (PITOCIN) 30 units in 500 mL 0.9% NaCl infusion  100-340 mL/hr Intravenous Continuous PRN         Medication Instructions: misoprostol (CYTOTEC)- Nurse to discuss ordering with provider, if needed. Ordered via \"OB misoprostol (CYTOTEC) Postpartum Hemorrhage PANEL\"   Does not apply Continuous PRN         methylergonovine (METHERGINE) injection 200 mcg  200 mcg Intramuscular Once PRN         carboprost (HEMABATE) injection 250 mcg  250 mcg Intramuscular Once PRN         lidocaine 1 % 0.1-20 mL  0.1-20 mL Subcutaneous Once PRN         ibuprofen (ADVIL/MOTRIN) tablet 800 mg  800 mg Oral Once PRN         oxyCODONE-acetaminophen (PERCOCET) 5-325 MG per tablet 1 tablet  1 tablet Oral Once PRN         medication instruction   Does not apply Continuous PRN         lactated ringers BOLUS 250 mL  250 mL " Intravenous Once PRN   250 mL at 09/15/17 1042     ePHEDrine injection 5 mg  5 mg Intravenous Q3 Min PRN   5 mg at 09/15/17 1040     nalbuphine (NUBAIN) injection 2.5-5 mg  2.5-5 mg Intravenous Q6H PRN         naloxone (NARCAN) injection 0.1-0.4 mg  0.1-0.4 mg Intravenous Q2 Min PRN         medication instruction   Does not apply Continuous PRN         Opioid plan postpartum - medication instruction   Does not apply Continuous PRN         lactated ringers BOLUS 1,000 mL  1,000 mL Intravenous Once         lidocaine-EPINEPHrine 1.5 %-1:601064 injection 3 mL  3 mL EPIDURAL Once PRN         fentaNYL (SUBLIMAZE) 2 mcg/mL, ropivacaine (NAROPIN) 0.2% in NaCl 0.9% EPIDURAL infusion   EPIDURAL Continuous 12 mL/hr at 09/15/17 1443 12 mL/hr at 09/15/17 1443     lidocaine-EPINEPHrine 1.5 %-1:125917 injection    PRN   3 mL at 09/15/17 1027     fentaNYL (PF) (SUBLIMAZE) 100 MCG/2ML injection             oxytocin in 0.9% NaCl (PITOCIN) 30 units/500 mL infusion             lactated ringers BOLUS 1,000 mL  1,000 mL Intravenous Once         lactated ringers infusion   Intravenous Continuous         sodium citrate-citric acid (BICITRA) solution 30 mL  30 mL Oral Pre-Op/Pre-procedure x 1 dose         ceFAZolin sodium-dextrose (ANCEF) infusion 2 g  2 g Intravenous Pre-Op/Pre-procedure x 1 dose         ceFAZolin (ANCEF) 1 g vial to attach to  ml bag for ADULT or 50 ml bag for PEDS  1 g Intravenous See Admin Instructions         No current Epic-ordered outpatient prescriptions on file.     Wt Readings from Last 1 Encounters:   09/15/17 67.1 kg (148 lb)     Temp Readings from Last 1 Encounters:   09/15/17 37.1  C (98.8  F) (Temporal)     BP Readings from Last 6 Encounters:   09/15/17 126/72   05/06/17 112/68   04/28/17 111/56     Pulse Readings from Last 4 Encounters:   05/06/17 97   04/28/17 75     Resp Readings from Last 1 Encounters:   09/15/17 16     SpO2 Readings from Last 1 Encounters:   09/15/17 96%     No results for  input(s): NA, POTASSIUM, CHLORIDE, CO2, ANIONGAP, GLC, BUN, CR, LINDA in the last 86444 hours.  No results for input(s): AST, ALT in the last 75706 hours.    Invalid input(s): ALP, BILT, LPSE  No results for input(s): WBC, HGB, PLT in the last 44145 hours.  No results for input(s): INR in the last 75521 hours.    Invalid input(s): APTT   No results for input(s): TROPI in the last 17441 hours.  RECENT LABS:   ECG:   ECHO:   CXR:                    .

## 2017-09-16 NOTE — ANESTHESIA POSTPROCEDURE EVALUATION
Patient: Ashley Cordon    * No procedures listed *    Diagnosis:* No pre-op diagnosis entered *  Diagnosis Additional Information: No value filed.    Anesthesia Type:  Epidural    Note:  Anesthesia Post Evaluation    Patient location during evaluation: floor  Patient participation: Able to fully participate in evaluation  Level of consciousness: awake and awake and alert  Pain management: adequate  Airway patency: patent  Cardiovascular status: acceptable  Respiratory status: acceptable  Hydration status: acceptable  PONV: none     Anesthetic complications: None    Comments: No epidural related complaints         Last vitals:  Vitals:    09/16/17 1430 09/16/17 1535 09/16/17 1545   BP:   105/66   Pulse:   110   Resp: 16 16 16   Temp:   37.1  C (98.8  F)   SpO2: 97%  96%         Electronically Signed By: Ashley Contreras  September 16, 2017  4:26 PM

## 2017-09-16 NOTE — PLAN OF CARE
Data: Ashley Cordon transferred to postpartum room 430 via hospital bed at 0145. Baby transferred via parent's arms.  Action: Receiving unit notified of transfer: Yes. Patient and family notified of room change. Report given to ALEXIA Escalera RN at 0148. Belongings sent to receiving unit. Accompanied by Registered Nurse. Oriented patient to surroundings. Call light within reach. ID bands double-checked with receiving RN. Fundus firm u/1 with scant amount of bleeding.   Response: Patient tolerated transfer and is stable.

## 2017-09-16 NOTE — ANESTHESIA POSTPROCEDURE EVALUATION
Patient: Ashley Cordon    Procedure(s):   - Wound Class: II-Clean Contaminated    Diagnosis:pregnancy  Diagnosis Additional Information: No value filed.    Anesthesia Type:  Epidural    Note:  Anesthesia Post Evaluation    Patient location during evaluation: bedside  Patient participation: Able to participate in evaluation but full recovery from regional anesthesia has not yet ocurrred but is anticipated to occur within 48 hours  Level of consciousness: awake  Pain management: adequate  Airway patency: patent  Cardiovascular status: acceptable  Respiratory status: acceptable  Hydration status: acceptable  PONV: none     Anesthetic complications: None    Comments: No anesthetic complications noted. Denies HA. Motor/Sensation intact bilaterally. Mild soreness at insertion site. Pleased with epidural management.         Last vitals:  Vitals:    09/16/17 0015 09/16/17 0030 09/16/17 0045   BP: 135/76 131/84 129/87   Pulse:   114   Resp: 22 20 21   Temp:      SpO2: 96% 96% 95%         Electronically Signed By: Nicolas Mijares DO, DO  September 16, 2017  1:08 AM

## 2017-09-16 NOTE — PROGRESS NOTES
"Ashley Cordon  September 16, 2017    S: pt is doing well.  Tolerating po intake and pain is well controlled.  Ambulating.  Decreasing lochia.     O:/67  Pulse 106  Temp 98.5  F (36.9  C) (Oral)  Resp 16  Ht 1.549 m (5' 1\")  Wt 67.1 kg (148 lb)  LMP 12/06/2016  SpO2 96%  Breastfeeding? Unknown  BMI 27.96 kg/m2  No results for input(s): HGB in the last 168 hours.  Abdomen: soft, non distended, fundus firm below the umbilicus.  Incision is C/D/I  Ext: non tender, no edema or erythema    A/P: s/p LTCS POD #1  Doing well  Continue care  Discharge planning for monday    Luisito Morgan    "

## 2017-09-16 NOTE — PLAN OF CARE
Problem: Goal Outcome Summary  Goal: Goal Outcome Summary  Outcome: Improving  Report given to MIRYAM Crowley @ 7770.

## 2017-09-17 PROCEDURE — 25000132 ZZH RX MED GY IP 250 OP 250 PS 637: Performed by: OBSTETRICS & GYNECOLOGY

## 2017-09-17 PROCEDURE — 12000037 ZZH R&B POSTPARTUM INTERMEDIATE

## 2017-09-17 RX ADMIN — ACETAMINOPHEN 975 MG: 325 TABLET, FILM COATED ORAL at 23:21

## 2017-09-17 RX ADMIN — OXYCODONE HYDROCHLORIDE 5 MG: 5 TABLET ORAL at 16:03

## 2017-09-17 RX ADMIN — OXYCODONE HYDROCHLORIDE 5 MG: 5 TABLET ORAL at 12:24

## 2017-09-17 RX ADMIN — OXYCODONE HYDROCHLORIDE 5 MG: 5 TABLET ORAL at 03:00

## 2017-09-17 RX ADMIN — ACETAMINOPHEN 975 MG: 325 TABLET, FILM COATED ORAL at 08:33

## 2017-09-17 RX ADMIN — SENNOSIDES AND DOCUSATE SODIUM 2 TABLET: 8.6; 5 TABLET ORAL at 20:02

## 2017-09-17 RX ADMIN — OXYCODONE HYDROCHLORIDE 5 MG: 5 TABLET ORAL at 09:49

## 2017-09-17 RX ADMIN — ACETAMINOPHEN 975 MG: 325 TABLET, FILM COATED ORAL at 16:03

## 2017-09-17 RX ADMIN — OXYCODONE HYDROCHLORIDE 5 MG: 5 TABLET ORAL at 06:00

## 2017-09-17 RX ADMIN — IBUPROFEN 800 MG: 400 TABLET ORAL at 20:02

## 2017-09-17 RX ADMIN — IBUPROFEN 800 MG: 400 TABLET ORAL at 06:00

## 2017-09-17 RX ADMIN — OXYCODONE HYDROCHLORIDE 5 MG: 5 TABLET ORAL at 20:02

## 2017-09-17 RX ADMIN — IBUPROFEN 800 MG: 400 TABLET ORAL at 12:24

## 2017-09-17 RX ADMIN — OXYCODONE HYDROCHLORIDE 5 MG: 5 TABLET ORAL at 23:21

## 2017-09-17 RX ADMIN — SENNOSIDES AND DOCUSATE SODIUM 1 TABLET: 8.6; 5 TABLET ORAL at 08:33

## 2017-09-17 NOTE — PROGRESS NOTES
"Ashley Cordon  September 17, 2017    S: pt is doing well.  Tolerating po intake and pain is well controlled.  Ambulating.  Decreasing lochia. Breast/Bottle feeding.    O:BP 97/56  Pulse 100  Temp 98.4  F (36.9  C) (Oral)  Resp 16  Ht 1.549 m (5' 1\")  Wt 67.1 kg (148 lb)  LMP 12/06/2016  SpO2 96%  Breastfeeding? Unknown  BMI 27.96 kg/m2    Recent Labs  Lab 09/16/17  0955   HGB 11.5*     Abdomen: soft, non distended, fundus firm below the umbilicus.  Incision is C/D/I  Ext: non tender, no edema or erythema    A/P: s/p LTCS POD #2  Doing well  Continue care  Discharge planning for tomorrow    Luisito Morgan    "

## 2017-09-17 NOTE — PLAN OF CARE
Problem: Goal Outcome Summary  Goal: Goal Outcome Summary  Outcome: No Change  VS WNL. Up with 1-assist to BR x2 today; however, very dizzy and lightheaded with ambulation. Fundus firm and midline at U/1 with scant to small flow of rubra. Incision well approximated with liquid bandage. Hypoactive bowel sounds x4, started passing some flatus this evening, decreased appetite this afternoon. Using tylenol, ibuprofen, oxycodone (5mg), and topical ice for pain. Adequate urine output per urethral catheter, removed 9/16/2017 at 1815, dtv later this evening. BLE +2 edema. Plan: continue to monitor and encourage activity as tolerated.

## 2017-09-17 NOTE — PLAN OF CARE
Problem: Goal Outcome Summary  Goal: Goal Outcome Summary  Outcome: Improving  VSS.  Breastfeeding with shield.  Tylenol, Ibuprofen, and Oxycodone controlling pain.  FF @ midline.  Abdominal incision JADEN.  Voiding adequately.  Will continue to monitor.

## 2017-09-18 VITALS
BODY MASS INDEX: 27.94 KG/M2 | WEIGHT: 148 LBS | DIASTOLIC BLOOD PRESSURE: 65 MMHG | SYSTOLIC BLOOD PRESSURE: 108 MMHG | HEART RATE: 93 BPM | HEIGHT: 61 IN | RESPIRATION RATE: 16 BRPM | TEMPERATURE: 97.8 F | OXYGEN SATURATION: 96 %

## 2017-09-18 PROCEDURE — 25000132 ZZH RX MED GY IP 250 OP 250 PS 637: Performed by: OBSTETRICS & GYNECOLOGY

## 2017-09-18 RX ADMIN — OXYCODONE HYDROCHLORIDE 5 MG: 5 TABLET ORAL at 01:50

## 2017-09-18 RX ADMIN — IBUPROFEN 800 MG: 400 TABLET ORAL at 08:18

## 2017-09-18 RX ADMIN — SENNOSIDES AND DOCUSATE SODIUM 1 TABLET: 8.6; 5 TABLET ORAL at 08:19

## 2017-09-18 RX ADMIN — IBUPROFEN 800 MG: 400 TABLET ORAL at 01:50

## 2017-09-18 RX ADMIN — OXYCODONE HYDROCHLORIDE 5 MG: 5 TABLET ORAL at 05:18

## 2017-09-18 RX ADMIN — OXYCODONE HYDROCHLORIDE 5 MG: 5 TABLET ORAL at 08:19

## 2017-09-18 RX ADMIN — ACETAMINOPHEN 975 MG: 325 TABLET, FILM COATED ORAL at 08:18

## 2017-09-18 NOTE — PLAN OF CARE
Problem: Goal Outcome Summary  Goal: Goal Outcome Summary  Outcome: Improving  VSS, Breast feeding.  Fundus is firm at U/1, scant flow, no clots.  Incision is approximated, liquid bandage, no drainage, JADEN.  Voiding and tolerating diet.  Independent with cares.   is at bedside and supportive.  Will continue to monitor and support.

## 2017-09-18 NOTE — PROGRESS NOTES
"Ashley Cordon  September 18, 2017    S: pt is doing well.  Tolerating po intake and pain is well controlled.  Ambulating.  Decreasing lochia. Breast/Bottle feeding.    O:/65 (BP Location: Right arm)  Pulse 93  Temp 97.8  F (36.6  C) (Oral)  Resp 16  Ht 1.549 m (5' 1\")  Wt 67.1 kg (148 lb)  LMP 12/06/2016  SpO2 96%  Breastfeeding? Unknown  BMI 27.96 kg/m2    Recent Labs  Lab 09/16/17  0955   HGB 11.5*     Abdomen: soft, non distended, fundus firm below the umbilicus.  Incision is C/D/I  Ext: non tender, no edema or erythema    A/P: s/p LTCS POD #3  Doing well  Continue care  Discharge planning for today    Luisito Morgan    "

## 2017-09-19 LAB — COPATH REPORT: NORMAL

## 2017-09-20 NOTE — DISCHARGE SUMMARY
Ashley Cordon  1990    9/15/2017  9/18/2017     Ashley Cordon   Home Medication Instructions [id number]    Printed on:09/20/17 2394   Medication Information                      ibuprofen (ADVIL/MOTRIN) 400 MG tablet  Take 1-2 tablets (400-800 mg) by mouth every 6 hours as needed for other (cramping)             oxyCODONE (ROXICODONE) 5 MG IR tablet  Take 1-2 tablets (5-10 mg) by mouth every 3 hours as needed for moderate to severe pain             potassium chloride (KLOR-CON) 20 MEQ Packet  Take 20 mEq by mouth daily             Prenatal Vit-Fe Fumarate-FA (PRENATAL MULTIVITAMIN  PLUS IRON) 27-0.8 MG TABS per tablet  Take 1 tablet by mouth daily             vitamin B complex with vitamin C (VITAMIN  B COMPLEX) TABS tablet  Take 1 tablet by mouth daily                 Course of Care:   Patient had elective induction of labor.  She made it to complete dilation and pushed, unable to delivery vaginally due to arrest of descent.  Underwent primary c/s and discharged POD 3.

## 2017-09-27 LAB
ALT SERPL-CCNC: 38 IU/L (ref 12–68)
AST SERPL-CCNC: 35 IU/L (ref 12–37)
CREAT SERPL-MCNC: 0.86 MG/DL (ref 0.55–1.02)
GFR SERPL CREATININE-BSD FRML MDRD: >60 ML/MIN
GLUCOSE SERPL-MCNC: 76 MG/DL (ref 74–106)
POTASSIUM SERPL-SCNC: 4.2 MMOL/L (ref 3.5–5.1)

## 2017-10-25 ENCOUNTER — TRANSFERRED RECORDS (OUTPATIENT)
Dept: HEALTH INFORMATION MANAGEMENT | Facility: CLINIC | Age: 27
End: 2017-10-25

## 2017-12-13 LAB
ALT SERPL-CCNC: 26 IU/L (ref 12–68)
AST SERPL-CCNC: 14 IU/L (ref 12–37)
CREAT SERPL-MCNC: 0.89 MG/DL (ref 0.55–1.02)
GFR SERPL CREATININE-BSD FRML MDRD: >60 ML/MIN
GLUCOSE SERPL-MCNC: 80 MG/DL (ref 74–106)
POTASSIUM SERPL-SCNC: 4 MMOL/L (ref 3.5–5.1)

## 2018-04-18 LAB
HPV ABSTRACT: NORMAL
PAP-ABSTRACT: NORMAL
TSH SERPL-ACNC: 0.95 UIU/ML (ref 0.36–3.74)

## 2018-12-21 NOTE — LACTATION NOTE
Initial visit.   Breastfeeding handout given.   Advised to breastfeed exclusively, on demand, avoid pacifiers, bottles and formula unless medically indicated.  Encouraged rooming in, skin to skin, feeding on demand 8-12x/day or sooner if baby cues.  Explained benefits of holding and skin to skin.  Encouraged lots of skin to skin. Instructed on hand expression.   Continues to nurse well with 20mm shield per mom. No further questions at this time.   Will follow as needed.   Kathy Moncada RNC, IBCLC      Concentration Of Solution Injected (Mg/Ml): 10.0 Include Z78.9 (Other Specified Conditions Influencing Health Status) As An Associated Diagnosis?: No Detail Level: Detailed X Size Of Lesion In Cm (Optional): 0 Total Volume Injected (Ccs- Only Use Numbers And Decimals): 1 Medical Necessity Clause: This procedure was medically necessary because the lesions that were treated were: Kenalog Preparation: Kenalog

## 2019-05-29 ENCOUNTER — TRANSFERRED RECORDS (OUTPATIENT)
Dept: HEALTH INFORMATION MANAGEMENT | Facility: CLINIC | Age: 29
End: 2019-05-29

## 2019-05-29 LAB
HPV ABSTRACT: NORMAL
PAP-ABSTRACT: NORMAL

## 2019-10-11 ENCOUNTER — HOSPITAL ENCOUNTER (EMERGENCY)
Facility: CLINIC | Age: 29
Discharge: HOME | End: 2019-10-11
Attending: INTERNAL MEDICINE | Admitting: INTERNAL MEDICINE
Payer: COMMERCIAL

## 2019-10-11 ENCOUNTER — TELEPHONE (OUTPATIENT)
Dept: EMERGENCY MEDICINE | Facility: CLINIC | Age: 29
End: 2019-10-11

## 2019-10-11 VITALS
SYSTOLIC BLOOD PRESSURE: 96 MMHG | TEMPERATURE: 97.4 F | OXYGEN SATURATION: 99 % | WEIGHT: 116.62 LBS | DIASTOLIC BLOOD PRESSURE: 64 MMHG | HEART RATE: 99 BPM | BODY MASS INDEX: 22.04 KG/M2 | RESPIRATION RATE: 20 BRPM

## 2019-10-11 DIAGNOSIS — K52.9 COLITIS: ICD-10-CM

## 2019-10-11 PROBLEM — D06.9 CIN III (CERVICAL INTRAEPITHELIAL NEOPLASIA GRADE III) WITH SEVERE DYSPLASIA: Status: ACTIVE | Noted: 2019-10-11

## 2019-10-11 PROBLEM — O02.1 MISSED ABORTION: Status: ACTIVE | Noted: 2019-10-11

## 2019-10-11 LAB
ALBUMIN SERPL-MCNC: 3.5 G/DL (ref 3.4–5)
ALP SERPL-CCNC: 74 U/L (ref 40–150)
ALT SERPL W P-5'-P-CCNC: 20 U/L (ref 0–50)
ANION GAP SERPL CALCULATED.3IONS-SCNC: 6 MMOL/L (ref 3–14)
AST SERPL W P-5'-P-CCNC: 17 U/L (ref 0–45)
BASOPHILS # BLD AUTO: 0 10E9/L (ref 0–0.2)
BASOPHILS NFR BLD AUTO: 0.2 %
BILIRUB SERPL-MCNC: 0.4 MG/DL (ref 0.2–1.3)
BUN SERPL-MCNC: 10 MG/DL (ref 7–30)
C COLI+JEJUNI+LARI FUSA STL QL NAA+PROBE: NOT DETECTED
C DIFF TOX B STL QL: POSITIVE
CALCIUM SERPL-MCNC: 8.7 MG/DL (ref 8.5–10.1)
CHLORIDE SERPL-SCNC: 106 MMOL/L (ref 94–109)
CO2 SERPL-SCNC: 26 MMOL/L (ref 20–32)
CREAT SERPL-MCNC: 0.74 MG/DL (ref 0.52–1.04)
DIFFERENTIAL METHOD BLD: ABNORMAL
EC STX1 GENE STL QL NAA+PROBE: NOT DETECTED
EC STX2 GENE STL QL NAA+PROBE: NOT DETECTED
ENTERIC PATHOGEN COMMENT: NORMAL
EOSINOPHIL # BLD AUTO: 0.1 10E9/L (ref 0–0.7)
EOSINOPHIL NFR BLD AUTO: 0.4 %
ERYTHROCYTE [DISTWIDTH] IN BLOOD BY AUTOMATED COUNT: 12.1 % (ref 10–15)
GFR SERPL CREATININE-BSD FRML MDRD: >90 ML/MIN/{1.73_M2}
GLUCOSE SERPL-MCNC: 102 MG/DL (ref 70–99)
HCG SERPL QL: NEGATIVE
HCT VFR BLD AUTO: 43.7 % (ref 35–47)
HGB BLD-MCNC: 14.4 G/DL (ref 11.7–15.7)
IMM GRANULOCYTES # BLD: 0.1 10E9/L (ref 0–0.4)
IMM GRANULOCYTES NFR BLD: 0.4 %
LYMPHOCYTES # BLD AUTO: 0.8 10E9/L (ref 0.8–5.3)
LYMPHOCYTES NFR BLD AUTO: 4.8 %
MCH RBC QN AUTO: 29.8 PG (ref 26.5–33)
MCHC RBC AUTO-ENTMCNC: 33 G/DL (ref 31.5–36.5)
MCV RBC AUTO: 90 FL (ref 78–100)
MONOCYTES # BLD AUTO: 0.7 10E9/L (ref 0–1.3)
MONOCYTES NFR BLD AUTO: 4.2 %
NEUTROPHILS # BLD AUTO: 14.9 10E9/L (ref 1.6–8.3)
NEUTROPHILS NFR BLD AUTO: 90 %
NOROV GI+II ORF1-ORF2 JNC STL QL NAA+PR: NOT DETECTED
NRBC # BLD AUTO: 0 10*3/UL
NRBC BLD AUTO-RTO: 0 /100
PLATELET # BLD AUTO: 245 10E9/L (ref 150–450)
POTASSIUM SERPL-SCNC: 3.6 MMOL/L (ref 3.4–5.3)
PROT SERPL-MCNC: 8.1 G/DL (ref 6.8–8.8)
RBC # BLD AUTO: 4.84 10E12/L (ref 3.8–5.2)
RVA NSP5 STL QL NAA+PROBE: NOT DETECTED
SALMONELLA SP RPOD STL QL NAA+PROBE: NOT DETECTED
SHIGELLA SP+EIEC IPAH STL QL NAA+PROBE: NOT DETECTED
SODIUM SERPL-SCNC: 138 MMOL/L (ref 133–144)
SPECIMEN SOURCE: ABNORMAL
V CHOL+PARA RFBL+TRKH+TNAA STL QL NAA+PR: NOT DETECTED
WBC # BLD AUTO: 16.6 10E9/L (ref 4–11)
Y ENTERO RECN STL QL NAA+PROBE: NOT DETECTED

## 2019-10-11 PROCEDURE — 87506 IADNA-DNA/RNA PROBE TQ 6-11: CPT | Performed by: FAMILY MEDICINE

## 2019-10-11 PROCEDURE — 87493 C DIFF AMPLIFIED PROBE: CPT | Mod: 91 | Performed by: FAMILY MEDICINE

## 2019-10-11 PROCEDURE — 96375 TX/PRO/DX INJ NEW DRUG ADDON: CPT

## 2019-10-11 PROCEDURE — 96361 HYDRATE IV INFUSION ADD-ON: CPT

## 2019-10-11 PROCEDURE — 99285 EMERGENCY DEPT VISIT HI MDM: CPT | Mod: 25

## 2019-10-11 PROCEDURE — 85025 COMPLETE CBC W/AUTO DIFF WBC: CPT | Performed by: FAMILY MEDICINE

## 2019-10-11 PROCEDURE — 80053 COMPREHEN METABOLIC PANEL: CPT | Performed by: FAMILY MEDICINE

## 2019-10-11 PROCEDURE — 25000128 H RX IP 250 OP 636: Performed by: FAMILY MEDICINE

## 2019-10-11 PROCEDURE — 96374 THER/PROPH/DIAG INJ IV PUSH: CPT

## 2019-10-11 PROCEDURE — 84703 CHORIONIC GONADOTROPIN ASSAY: CPT | Performed by: FAMILY MEDICINE

## 2019-10-11 PROCEDURE — 25000131 ZZH RX MED GY IP 250 OP 636 PS 637: Performed by: FAMILY MEDICINE

## 2019-10-11 RX ORDER — VANCOMYCIN HYDROCHLORIDE 125 MG/1
125 CAPSULE ORAL 4 TIMES DAILY
Qty: 40 CAPSULE | Refills: 0 | Status: SHIPPED | OUTPATIENT
Start: 2019-10-11 | End: 2019-10-21

## 2019-10-11 RX ORDER — DULOXETIN HYDROCHLORIDE 60 MG/1
1 CAPSULE, DELAYED RELEASE ORAL
COMMUNITY
Start: 2018-12-28 | End: 2020-07-16

## 2019-10-11 RX ORDER — ONDANSETRON 4 MG/1
4 TABLET, ORALLY DISINTEGRATING ORAL
Status: COMPLETED | OUTPATIENT
Start: 2019-10-11 | End: 2019-10-11

## 2019-10-11 RX ORDER — DROSPIRENONE AND ETHINYL ESTRADIOL 0.03MG-3MG
KIT ORAL
COMMUNITY
Start: 2019-06-06 | End: 2020-04-29

## 2019-10-11 RX ORDER — HYDROMORPHONE HYDROCHLORIDE 1 MG/ML
0.5 INJECTION, SOLUTION INTRAMUSCULAR; INTRAVENOUS; SUBCUTANEOUS ONCE
Status: COMPLETED | OUTPATIENT
Start: 2019-10-11 | End: 2019-10-11

## 2019-10-11 RX ORDER — KETOROLAC TROMETHAMINE 15 MG/ML
15 INJECTION, SOLUTION INTRAMUSCULAR; INTRAVENOUS ONCE
Status: COMPLETED | OUTPATIENT
Start: 2019-10-11 | End: 2019-10-11

## 2019-10-11 RX ADMIN — SODIUM CHLORIDE 1000 ML: 9 INJECTION, SOLUTION INTRAVENOUS at 11:08

## 2019-10-11 RX ADMIN — ONDANSETRON 4 MG: 4 TABLET, ORALLY DISINTEGRATING ORAL at 09:38

## 2019-10-11 RX ADMIN — HYDROMORPHONE HYDROCHLORIDE 0.5 MG: 1 INJECTION, SOLUTION INTRAMUSCULAR; INTRAVENOUS; SUBCUTANEOUS at 09:38

## 2019-10-11 RX ADMIN — KETOROLAC TROMETHAMINE 15 MG: 15 INJECTION, SOLUTION INTRAMUSCULAR; INTRAVENOUS at 09:00

## 2019-10-11 RX ADMIN — SODIUM CHLORIDE 1000 ML: 9 INJECTION, SOLUTION INTRAVENOUS at 08:43

## 2019-10-11 ASSESSMENT — ENCOUNTER SYMPTOMS
NAUSEA: 1
FEVER: 0
SINUS PRESSURE: 1
ABDOMINAL PAIN: 1
SINUS PAIN: 1
DIARRHEA: 1
VOMITING: 0

## 2019-10-11 NOTE — DISCHARGE INSTRUCTIONS
Take the vancomycin 4 times a day  Stay hydrated, avoid sugary drinks as they may worsen diarrhea  Return for fevers >101, worsening belly pain, if you're unable to keep fluids or antibiotics down, bloody stools  See your regular doctor on Monday at the latest

## 2019-10-11 NOTE — ED TRIAGE NOTES
Arrives with diarrhea after starting her third round of antibiotics this year to treat a sinus infection. States her stools are loose and contains mucous and some blood. Also complaining of severe abdominal cramping. Alert and oriented, ABCs intact.

## 2019-10-11 NOTE — LETTER
October 11, 2019      To Whom It May Concern:      Ashley Cordon was seen in our Emergency Department today, 10/11/19.  I expect her condition to improve over the next 4 days. She may return to work/school when improved.      Sincerely,          Whitney Wong MD

## 2019-10-11 NOTE — ED PROVIDER NOTES
History     Chief Complaint:  Diarrhea    HPI   Ashley Cordon is a 29 year old female who presents to the ED for evaluation of diarrhea. The patient is currently on her third round of antibiotics, Augmentin, for treatment of her recurrent sinusitis.   The patient notes that she has been having diarrhea since she began this round of antibiotics, but notes that her sinus symptoms have improved.     Today patient had an episode of bloody diarrhea, with some mucus, as well as onset of lower abdominal cramping.  This is very intense, denies any radiations, no changes with bowel movements. The patient denies any fevers or vomiting.     No dysuria, frequency, hematuria.  Last menstrual period was 3 weeks ago, patient is on oral contraceptives, reports no concern for pregnancy.  She also denies vaginal discharge.  Has a h/o , no recent travel. No other abdominal surgeries    Allergies:  The patient has no known drug allergies.    Medications:    Advil  Vitamin B complex  Augmentin    Past Medical History:    Missed   ASCUS of cervix with negative high risk HPV  KAI III with severe dysplasia  Sinus infection    Past Surgical History:     section  LEEP procedure  [place] teeth extraction  D&C    Family History:    No past pertinent family history.    Social History:  Negative for tobacco use.  Negative for alcohol use.  Negative for drug use.  Marital Status:  [2]     Review of Systems   Constitutional: Negative for fever.   HENT: Positive for sinus pressure and sinus pain.    Gastrointestinal: Positive for abdominal pain, diarrhea and nausea. Negative for vomiting.   All other systems reviewed and are negative.      Physical Exam     Patient Vitals for the past 24 hrs:   BP Temp Temp src Pulse Resp SpO2 Weight   10/11/19 1045 106/70 -- -- 88 -- 98 % --   10/11/19 1030 110/67 -- -- 90 -- 100 % --   10/11/19 0755 116/89 97.4  F (36.3  C) Temporal 110 20 100 % 52.9 kg (116 lb 10 oz)     Physical  Exam  General: appears uncomfortable. Alert, interactive. Non-toxic appearing.    HEENT: No signs of trauma. No rhinorrhea. Tacky mucous membranes.   Eyes: Conjunctivae and sclera are normal. Pupils are equal.   Neck: Normal range of motion.   Resp: No respiratory distress. Normal breath sounds throughout without rales/wheezing.   CV: mildly tachycardic RRR, no murmurs. Normal capillary refill.  Abdomen: non-distended. Soft, TTP lower abdomen. No rebound or guarding. Normal bowel sounds.   MSK: no leg edema. No obvious deformities  Neuro: The patient is alert and interactive. Speech normal. No gross focal symptoms.  Skin: Warm and dry. No lesions or sign of trauma noted.   Psych: Affect is appropriate and concordant with mood, behavior is normal.    Emergency Department Course   Laboratory:  CBC: WBC: 16.6 (H), HGB: 14.4, PLT: 245  CMP: Glucose 102 (H), o/w WNL (Creatinine: 0.74)  HCG qualitative blood: negative    Enteric bacteria and virus panel by [facility] stool: pending  Clostridium difficile toxin B PCR: pending    Interventions:  0843 NS 1L IV Bolus  0900 Toradol injection 15 mg IV  0938 Dilaudid injection 0.5 mg IV  1108 NS 1L IV Bolus    Emergency Department Course:  Nursing notes and vitals reviewed by resident, Dr. Wong at 0803, I performed an exam of the patient as documented above.     IV inserted. Medicine administered as documented above. Blood drawn. Stool sample taken.This was sent to the lab for further testing, results above.     1015: re-check. Patient feeling improved after IVF's, antiemetics and pain medications  1115: re-check. Patient feeling well, pain down to 3/10, tolerating fluids.     Findings and plan explained to the Patient. Patient discharged home with instructions regarding supportive care, medications, and reasons to return. The importance of close follow-up was reviewed. The patient was prescribed Vancomycin. Declined Rx for Zofran as she has antiemetics at home  already.    Impression & Plan    Medical Decision Making:  Ashley Cordon is a 29 year old female who presents with diarrhea and abdominal pain in the setting of Augmentin use for acute sinusitis.   I considered a broad differential including  diverticulitis, colitis, appendicitis, functional bowel disease, UTI, PID, pyelonephritis, among others.    The workup in the ED is consistent with colitis, most likely C. Difficile vs non-specific viral/bacterial. Sent stool studies and C. Diff.   The patient is hemodynamically stable, looks well and pain significantly improved with Dilaudid and IVF's. She has a reassuring exam. Given leukocytosis >16K, concern for severe C. difficile infection, discussed admission for serial exams and further workup vs discharge home with close monitoring.  She elected to do the latter. Will complete IVF's, trial PO, start vancomycin PO. Discharge with vancomycin QID for 10 days. Follow-up with PCP in 3 days, return sooner for any fevers >101, worsening pain, unable to tolerate antibiotics or keep fluids down, changes in mental status, or other new/concerning symptoms.    Work letter given, as she works in [employer].    Patient voiced understanding of findings, plan was created collaboratively, and she will follow-up with PCP as discussed. All questions answered prior to discharge    Diagnosis:    ICD-10-CM   1. Colitis K52.9    likely due to C.difficile     Disposition:  Discharged to home with .    Discharge Medications:  New Prescriptions    VANCOMYCIN (VANCOCIN) 125 MG CAPSULE    Take 1 capsule (125 mg) by mouth 4 times daily for 10 days       Whitney Wong MD  PGY-2 Family Medicine Resident [facility]  Pager: [phone]       Scribe Disclosure:  I, Jeri Hernandez, am serving as a scribe on 10/11/2019 at 8:03 AM to personally document services performed by Marika Leiva MD based on my observations and the provider's statements to me.     Jeri Hernandez  10/11/2019   [place]  [facility] EMERGENCY DEPARTMENT       Whitney Wong MD  Resident  10/11/19 [id number]

## 2019-10-11 NOTE — TELEPHONE ENCOUNTER
[facility] Emergency Department Lab result notification:    [place] ED lab result protocol used  Clostridium Difficile Protocol    Reason for call  Notify of lab results, assess symptoms,  review ED providers recommendations/discharge instructions (if necessary) and advise per ED lab result f/u protocol    Lab Result   Final Clostridium difficile toxin B PCR is POSITIVE.  Toxin producing Clostridium difficile target DNA sequences detected, presumed positive for Clostridium difficile toxin B.   Rx (Flagyl or Vancomycin) prescribed upon discharge from the [place] ED/UC:  Vancomycin (VANCOCIN HCL) 125 MG capsule, Take 1 capsule (125 mg) by mouth 4 times daily for 10 days   If positive for C diff toxin B and treated appropriately, notify patient of results.       Information table from ED Provider visit on 10/11/19  Symptoms reported at ED visit (Chief complaint, HPI) Ashley Cordon is a 29 year old female who presents to the ED for evaluation of diarrhea. The patient is currently on her third round of antibiotics, Augmentin, for treatment of her recurrent sinusitis.   The patient notes that she has been having diarrhea since she began this round of antibiotics, but notes that her sinus symptoms have improved.      Today patient had an episode of bloody diarrhea, with some mucus, as well as onset of lower abdominal cramping.  This is very intense, denies any radiations, no changes with bowel movements. The patient denies any fevers or vomiting.      No dysuria, frequency, hematuria.  Last menstrual period was 3 weeks ago, patient is on oral contraceptives, reports no concern for pregnancy.  She also denies vaginal discharge.  Has a h/o , no recent travel. No other abdominal surgeries   ED providers Impression and Plan (applicable information) Ashley Cordon is a 29 year old female who presents with diarrhea and abdominal pain in the setting of Augmentin use for acute sinusitis.   I considered a  "broad differential including  diverticulitis, colitis, appendicitis, functional bowel disease, UTI, PID, pyelonephritis, among others.    The workup in the ED is consistent with colitis, most likely C. Difficile vs non-specific viral/bacterial. Sent stool studies and C. Diff.   The patient is hemodynamically stable, looks well and pain significantly improved with Dilaudid and IVF's. She has a reassuring exam. Given leukocytosis >16K, concern for severe C. difficile infection, discussed admission for serial exams and further workup vs discharge home with close monitoring.  She elected to do the latter. Will complete IVF's, trial PO, start vancomycin PO. Discharge with vancomycin QID for 10 days. Follow-up with PCP in 3 days, return sooner for any fevers >101, worsening pain, unable to tolerate antibiotics or keep fluids down, changes in mental status, or other new/concerning symptoms.    Work letter given, as she works in [employer].    Patient voiced understanding of findings, plan was created collaboratively, and she will follow-up with PCP as discussed. All questions answered prior to discharge   Miscellaneous information Enteric Bacteria and Virus Panel : pending.     RN Assessment (Patient s current Symptoms), include time called.  [Insert Left message here if message left]  Slightly \"better\".   Advised if any further abnormal tests result, she will be contacted.     RN Recommendations/Instructions per [place] ED lab result protocol  Did review general information per protocol including infection control related to C diff.  Questions answered.    Please Contact your PCP clinic or return to the Emergency department if your:    Symptoms return.    Symptoms do not improve after 3 days on antibiotic.    Symptoms do not resolve after completing antibiotic.    Symptoms worsen or other concerning symptom's.    PCP follow-up Questions asked: YES       Sherie Arvizu RN    [employer] [place]   Lung Nodule and ED Lab Results " F/U RN  Epic pool (ED late result f/u RN) : [id number]  Ph # [phone]    Copy of Lab result   Order   Clostridium difficile toxin B PCR [[id number]] (Order [id number])   Order Requisition     Clostridium difficile toxin B PCR (Order #[id number]) on 10/11/19   Exam Information     Exam Date Exam Time Accession # Results    10/11/19  8:42 AM [id number]    Specimen Information: Feces        Component Value Flag Ref Range Units Status Collected Lab   Specimen Description Feces     Final 10/11/2019  8:42 AM FrRd   C Diff Toxin B PCR Positive  Abnormal   NEG^Negative  Final 10/11/2019  8:42 AM 51

## 2019-10-11 NOTE — ED NOTES
Emergency Department Attending Supervision Note  10/11/2019  8:26 AM      I evaluated this patient in conjunction with Dr. Whitney Wong MD, Resident      Briefly, the patient presented with abdominal pain and diarrhea after starting her third round of antibiotics to treat recurrent sinusitis. The patient reports that her stools are loose and contain some mucous and blood. The patient also complains of severe abdominal cramping.       On my exam, patient has a nontoxic clinical appearance.  She is mildly tachycardic.  Her abdomen has mild left lower quadrant tenderness without rebound or guarding.    ED course: Patient was given IV fluids, antiemetics, and medication for pain.  With this she was feeling improved and able to take oral fluids.  Stool was obtained for studies.    MDM: Ashley Cordon is a 29 year old female who presents with diarrhea, cramping, mucousy bloody stool and leukocytosis in the context of current antibiotic treatment.  With this presentation I am suspicious of C. difficile colitis.  She does have a white count over 15,000 but no other indications of severe disease.  She is comfortable with discharge home which I think is reasonable.  We will start empiric oral vancomycin pending C. difficile testing, close follow-up in clinic, return if worse or new symptoms.    Diagnosis    ICD-10-CM    1. Colitis K52.9     likely due to C.difficile         Marika Leiva MD [place], Susan Gail, MD  10/11/19 1126

## 2019-10-11 NOTE — ED AVS SNAPSHOT
[facility] Emergency Department  [address]  [facility] [address]  Phone:  [phone]  Fax:  [phone]                                    Ashley Cordon   MRN: [id number]    Department:  [facility] Emergency Department   Date of Visit:  10/11/2019           After Visit Summary Signature Page    I have received my discharge instructions, and my questions have been answered. I have discussed any challenges I see with this plan with the nurse or doctor.    ..........................................................................................................................................  Patient/Patient Representative Signature      ..........................................................................................................................................  Patient Representative Print Name and Relationship to Patient    ..................................................               ................................................  Date                                   Time    ..........................................................................................................................................  Reviewed by Signature/Title    ...................................................              ..............................................  Date                                               Time          22EPIC Rev 08/18

## 2019-12-04 ENCOUNTER — HOSPITAL ENCOUNTER (EMERGENCY)
Facility: CLINIC | Age: 29
Discharge: HOME | End: 2019-12-04
Attending: EMERGENCY MEDICINE | Admitting: EMERGENCY MEDICINE
Payer: COMMERCIAL

## 2019-12-04 VITALS
DIASTOLIC BLOOD PRESSURE: 66 MMHG | SYSTOLIC BLOOD PRESSURE: 113 MMHG | HEART RATE: 81 BPM | RESPIRATION RATE: 18 BRPM | OXYGEN SATURATION: 96 % | TEMPERATURE: 98.4 F

## 2019-12-04 DIAGNOSIS — A04.72 C. DIFFICILE DIARRHEA: ICD-10-CM

## 2019-12-04 DIAGNOSIS — R10.84 ABDOMINAL PAIN, GENERALIZED: ICD-10-CM

## 2019-12-04 LAB
ALBUMIN SERPL-MCNC: 3.3 G/DL (ref 3.4–5)
ALBUMIN UR-MCNC: NEGATIVE MG/DL
ALP SERPL-CCNC: 64 U/L (ref 40–150)
ALT SERPL W P-5'-P-CCNC: 16 U/L (ref 0–50)
ANION GAP SERPL CALCULATED.3IONS-SCNC: 4 MMOL/L (ref 3–14)
APPEARANCE UR: CLEAR
AST SERPL W P-5'-P-CCNC: 20 U/L (ref 0–45)
BASOPHILS # BLD AUTO: 0.1 10E9/L (ref 0–0.2)
BASOPHILS NFR BLD AUTO: 0.4 %
BILIRUB SERPL-MCNC: 0.3 MG/DL (ref 0.2–1.3)
BILIRUB UR QL STRIP: NEGATIVE
BUN SERPL-MCNC: 9 MG/DL (ref 7–30)
C DIFF TOX B STL QL: POSITIVE
CALCIUM SERPL-MCNC: 8.8 MG/DL (ref 8.5–10.1)
CHLORIDE SERPL-SCNC: 105 MMOL/L (ref 94–109)
CO2 SERPL-SCNC: 28 MMOL/L (ref 20–32)
COLOR UR AUTO: YELLOW
CREAT SERPL-MCNC: 0.73 MG/DL (ref 0.52–1.04)
DIFFERENTIAL METHOD BLD: ABNORMAL
EOSINOPHIL # BLD AUTO: 0.4 10E9/L (ref 0–0.7)
EOSINOPHIL NFR BLD AUTO: 2.7 %
ERYTHROCYTE [DISTWIDTH] IN BLOOD BY AUTOMATED COUNT: 12.6 % (ref 10–15)
GFR SERPL CREATININE-BSD FRML MDRD: >90 ML/MIN/{1.73_M2}
GLUCOSE SERPL-MCNC: 83 MG/DL (ref 70–99)
GLUCOSE UR STRIP-MCNC: NEGATIVE MG/DL
HCG UR QL: NEGATIVE
HCT VFR BLD AUTO: 39.9 % (ref 35–47)
HGB BLD-MCNC: 13.3 G/DL (ref 11.7–15.7)
HGB UR QL STRIP: NEGATIVE
IMM GRANULOCYTES # BLD: 0 10E9/L (ref 0–0.4)
IMM GRANULOCYTES NFR BLD: 0.3 %
KETONES UR STRIP-MCNC: NEGATIVE MG/DL
LEUKOCYTE ESTERASE UR QL STRIP: ABNORMAL
LIPASE SERPL-CCNC: 46 U/L (ref 73–393)
LYMPHOCYTES # BLD AUTO: 2.5 10E9/L (ref 0.8–5.3)
LYMPHOCYTES NFR BLD AUTO: 19.3 %
MCH RBC QN AUTO: 30.4 PG (ref 26.5–33)
MCHC RBC AUTO-ENTMCNC: 33.3 G/DL (ref 31.5–36.5)
MCV RBC AUTO: 91 FL (ref 78–100)
MONOCYTES # BLD AUTO: 0.7 10E9/L (ref 0–1.3)
MONOCYTES NFR BLD AUTO: 5.4 %
MUCOUS THREADS #/AREA URNS LPF: PRESENT /LPF
NEUTROPHILS # BLD AUTO: 9.2 10E9/L (ref 1.6–8.3)
NEUTROPHILS NFR BLD AUTO: 71.9 %
NITRATE UR QL: NEGATIVE
NRBC # BLD AUTO: 0 10*3/UL
NRBC BLD AUTO-RTO: 0 /100
PH UR STRIP: 6.5 PH (ref 5–7)
PLATELET # BLD AUTO: 265 10E9/L (ref 150–450)
POTASSIUM SERPL-SCNC: 3.4 MMOL/L (ref 3.4–5.3)
PROT SERPL-MCNC: 7.7 G/DL (ref 6.8–8.8)
RBC # BLD AUTO: 4.38 10E12/L (ref 3.8–5.2)
RBC #/AREA URNS AUTO: 1 /HPF (ref 0–2)
SODIUM SERPL-SCNC: 137 MMOL/L (ref 133–144)
SOURCE: ABNORMAL
SP GR UR STRIP: 1.02 (ref 1–1.03)
SPECIMEN SOURCE: ABNORMAL
SQUAMOUS #/AREA URNS AUTO: 9 /HPF (ref 0–1)
UROBILINOGEN UR STRIP-MCNC: NORMAL MG/DL (ref 0–2)
WBC # BLD AUTO: 12.9 10E9/L (ref 4–11)
WBC #/AREA URNS AUTO: 3 /HPF (ref 0–5)

## 2019-12-04 PROCEDURE — 96361 HYDRATE IV INFUSION ADD-ON: CPT

## 2019-12-04 PROCEDURE — 85025 COMPLETE CBC W/AUTO DIFF WBC: CPT | Performed by: EMERGENCY MEDICINE

## 2019-12-04 PROCEDURE — 83690 ASSAY OF LIPASE: CPT | Performed by: EMERGENCY MEDICINE

## 2019-12-04 PROCEDURE — 96374 THER/PROPH/DIAG INJ IV PUSH: CPT

## 2019-12-04 PROCEDURE — 81001 URINALYSIS AUTO W/SCOPE: CPT | Performed by: EMERGENCY MEDICINE

## 2019-12-04 PROCEDURE — 96376 TX/PRO/DX INJ SAME DRUG ADON: CPT

## 2019-12-04 PROCEDURE — 87493 C DIFF AMPLIFIED PROBE: CPT | Performed by: EMERGENCY MEDICINE

## 2019-12-04 PROCEDURE — 25800030 ZZH RX IP 258 OP 636: Performed by: EMERGENCY MEDICINE

## 2019-12-04 PROCEDURE — 80053 COMPREHEN METABOLIC PANEL: CPT | Performed by: EMERGENCY MEDICINE

## 2019-12-04 PROCEDURE — 99285 EMERGENCY DEPT VISIT HI MDM: CPT | Mod: 25

## 2019-12-04 PROCEDURE — 81025 URINE PREGNANCY TEST: CPT | Performed by: EMERGENCY MEDICINE

## 2019-12-04 PROCEDURE — 25000128 H RX IP 250 OP 636: Performed by: EMERGENCY MEDICINE

## 2019-12-04 RX ORDER — HYDROMORPHONE HYDROCHLORIDE 1 MG/ML
0.5 INJECTION, SOLUTION INTRAMUSCULAR; INTRAVENOUS; SUBCUTANEOUS
Status: DISCONTINUED | OUTPATIENT
Start: 2019-12-04 | End: 2019-12-04 | Stop reason: HOSPADM

## 2019-12-04 RX ORDER — VANCOMYCIN HYDROCHLORIDE 125 MG/1
125 CAPSULE ORAL 4 TIMES DAILY
Qty: 56 CAPSULE | Refills: 0 | Status: SHIPPED | OUTPATIENT
Start: 2019-12-04 | End: 2020-04-29

## 2019-12-04 RX ORDER — ONDANSETRON 4 MG/1
4 TABLET, ORALLY DISINTEGRATING ORAL EVERY 6 HOURS PRN
Qty: 15 TABLET | Refills: 0 | Status: SHIPPED | OUTPATIENT
Start: 2019-12-04 | End: 2020-04-29

## 2019-12-04 RX ORDER — HYDROCODONE BITARTRATE AND ACETAMINOPHEN 5; 325 MG/1; MG/1
1 TABLET ORAL EVERY 6 HOURS PRN
Qty: 12 TABLET | Refills: 0 | Status: SHIPPED | OUTPATIENT
Start: 2019-12-04 | End: 2020-04-29

## 2019-12-04 RX ADMIN — HYDROMORPHONE HYDROCHLORIDE 0.5 MG: 1 INJECTION, SOLUTION INTRAMUSCULAR; INTRAVENOUS; SUBCUTANEOUS at 16:11

## 2019-12-04 RX ADMIN — HYDROMORPHONE HYDROCHLORIDE 0.5 MG: 1 INJECTION, SOLUTION INTRAMUSCULAR; INTRAVENOUS; SUBCUTANEOUS at 16:37

## 2019-12-04 RX ADMIN — SODIUM CHLORIDE 1000 ML: 9 INJECTION, SOLUTION INTRAVENOUS at 16:11

## 2019-12-04 ASSESSMENT — ENCOUNTER SYMPTOMS
NAUSEA: 1
VOMITING: 0
FEVER: 0
DIARRHEA: 1
DYSURIA: 0

## 2019-12-04 NOTE — DISCHARGE INSTRUCTIONS
Please call the infectious disease specialist for follow-up regarding treatment of your C. difficile.  Please see the number attached.  When you call please let them know that this is regarding recurrent C. difficile colitis.    Discharge Instructions  Abdominal Pain    Abdominal pain (belly pain) can be caused by many things. Your evaluation today does not show the exact cause for your pain. Your provider today has decided that it is unlikely your pain is due to a life threatening problem, or a problem requiring surgery or hospital admission. Sometimes those problems cannot be found right away, so it is very important that you follow up as directed.  Sometimes only the changes which occur over time allow the cause of your pain to be found.    Generally, every Emergency Department visit should have a follow-up clinic visit with either a primary or a specialty clinic/provider. Please follow-up as instructed by your emergency provider today. With abdominal pain, we often recommend very close follow-up, such as the following day.    ADULTS:  Return to the Emergency Department right away if:    You get an oral temperature above 102oF or as directed by your provider.  You have blood in your stools. This may be bright red or appear as black, tarry stools.    You keep vomiting (throwing up) or cannot drink liquids.  You see blood when you vomit.   You cannot have a bowel movement or you cannot pass gas.  Your stomach gets bloated or bigger.  Your skin or the whites of your eyes look yellow.  You faint.  You have bloody, frequent or painful urination (peeing).  You have new symptoms or anything that worries you.    CHILDREN:  Return to the Emergency Department right away if your child has any of the above-listed symptoms or the following:    Pushes your hand away or screams/cries when his/her belly is touched.  You notice your child is very fussy or weak.  Your child is very tired and is too tired to eat or drink.  Your  child is dehydrated.  Signs of dehydration can be:  Significant change in the amount of wet diapers/urine.  Your infant or child starts to have dry mouth and lips, or no saliva (spit) or tears.    PREGNANT WOMEN:  Return to the Emergency Department right away if you have any of the above-listed symptoms or the following:    You have bleeding, leaking fluid or passing tissue from the vagina.  You have worse pain or cramping, or pain in your shoulder or back.  You have vomiting that will not stop.  You have a temperature of 100oF or more.  Your baby is not moving as much as usual.  You faint.  You get a bad headache with or without eye problems and abdominal pain.  You have a seizure.  You have unusual discharge from your vagina and abdominal pain.    Abdominal pain is pretty common during pregnancy.  Your pain may or may not be related to your pregnancy. You should follow-up closely with your OB provider so they can evaluate you and your baby.  Until you follow-up with your regular provider, do the following:     Avoid sex and do not put anything in your vagina.  Drink clear fluids.  Only take medications approved by your provider.    MORE INFORMATION:    Appendicitis:  A possible cause of abdominal pain in any person who still has their appendix is acute appendicitis. Appendicitis is often hard to diagnose.  Testing does not always rule out early appendicitis or other causes of abdominal pain. Close follow-up with your provider and re-evaluations may be needed to figure out the reason for your abdominal pain.    Follow-up:  It is very important that you make an appointment with your clinic and go to the appointment.  If you do not follow-up with your primary provider, it may result in missing an important development which could result in permanent injury or disability and/or lasting pain.  If there is any problem keeping your appointment, call your provider or return to the Emergency Department.    Medications:   "Take your medications as directed by your provider today.  Before using over-the-counter medications, ask your provider and make sure to take the medications as directed.  If you have any questions about medications, ask your provider.    Diet:  Resume your normal diet as much as possible, but do not eat fried, fatty or spicy foods while you have pain.  Do not drink alcohol or have caffeine.  Do not smoke tobacco.    Probiotics: If you have been given an antibiotic, you may want to also take a probiotic pill or eat yogurt with live cultures. Probiotics have \"good bacteria\" to help your intestines stay healthy. Studies have shown that probiotics help prevent diarrhea (loose stools) and other intestine problems (including C. diff infection) when you take antibiotics. You can buy these without a prescription in the pharmacy section of the store.     If you were given a prescription for medicine here today, be sure to read all of the information (including the package insert) that comes with your prescription.  This will include important information about the medicine, its side effects, and any warnings that you need to know about.  The pharmacist who fills the prescription can provide more information and answer questions you may have about the medicine.  If you have questions or concerns that the pharmacist cannot address, please call or return to the Emergency Department.       Remember that you can always come back to the Emergency Department if you are not able to see your regular provider in the amount of time listed above, if you get any new symptoms, or if there is anything that worries you.    "

## 2019-12-04 NOTE — ED PROVIDER NOTES
History     Chief Complaint:  Abdominal Pain    HPI   Ashley Cordon is a 29 year old female with a history of C difficile who presents to the emergency department for evaluation of abdominal pain and diarrhea.  Patient states that she was diagnosed with C. difficile in October of this year.  Her symptoms were crampy abdominal pain and loose diarrhea with approximately 4-5 episodes per day.  She had positive C. difficile testing and was placed on a 10-day course of vancomycin.  She states her symptoms improved but she never returned to baseline.  Over the last 2 days, she has had increased diffuse abdominal cramping.  The cramping is relatively constant with no alleviating or aggravating factors.  She is now having 3-4 episodes of loose stool a day including mucousy discharge.  She denies any hematochezia, fever or vomiting.  She has had associated nausea.  She has no other complaints or concerns..     Allergies:  NKDA     Medications:    Ocella  Cymbalta     Past Medical History:    Cervical intraepithelial neoplasia grade III with severe dysplasia    Past Surgical History:    D&C  C section    Family History:    No past pertinent family history.    Social History:  Never smoker.  Negative for alcohol use.  Marital Status:   [2]     Review of Systems   Constitutional: Negative for fever.   Gastrointestinal: Positive for diarrhea and nausea. Negative for vomiting.   Genitourinary: Negative for dysuria.   All other systems reviewed and are negative.      Physical Exam     Patient Vitals for the past 24 hrs:   BP Temp Temp src Pulse Resp SpO2   12/04/19 1530 109/65 -- -- -- -- 99 %   12/04/19 1343 108/60 98.4  F (36.9  C) Temporal 101 18 100 %     Physical Exam    Constitutional:  Pleasant, age appropriate.       Resting comfortably in the bed.  HEENT:    Oropharynx is moist.  Eyes:    Conjunctiva normal  Neck:    Supple, no meningismus.     CV:     Regular rate and rhythm.      No murmurs, rubs or  gallops.     No lower extremity edema.  PULM:    Clear to auscultation bilateral.       No respiratory distress.      Good air exchange.  ABD:    Soft, non-distended.       Mild diffuse abdominal tenderness.     Bowel sounds normal.     No pulsatile masses.       No rebound, guarding or rigidity.     No CVA tenderness.   MSK:     No gross deformity to all four extremities.   LYMPH:   No cervical lymphadenopathy.  NEURO:   Alert.  Good muscular tone, no atrophy.   Skin:    Warm, dry and intact.    Psych:    Mood is good and affect is appropriate.      Emergency Department Course     Laboratory:  Laboratory findings were communicated with the patient who voiced understanding of the findings.    CBC: WBC: 12.9 (H), HGB: 13.3, PLT: 265  CMP: Albumin 3.3 (L), o/w WNL (Creatinine: 0.73)  Lipase: 46 (L)    UA with micro: Leukocyte Esterase Trace, Squamous Epithelial 9 (H), Mucous Present, o/w negative    HCG qualitative urine: Negative    Clostridium difficile toxin B PCR pending.    Interventions:  1611 NS 1L IV Bolus  1637 Dilaudid 0.5 mg IV    Emergency Department Course:  Past medical records, nursing notes, and vitals reviewed.    1615 I performed an exam of the patient as documented above.     IV was inserted and blood was drawn for laboratory testing, results above.    The patient provided a urine sample here in the emergency department. This was sent for laboratory testing, findings above.    Stool sample was obtained and sent for laboratory analysis, findings above.    1700 I rechecked the patient and discussed the results of her workup thus far.     Findings and plan explained to the Patient. Patient discharged home with instructions regarding supportive care, medications, and reasons to return. The importance of close follow-up was reviewed. The patient was prescribed Norco, Zofran-ODT, Vancocin.    I personally reviewed the laboratory results with the Patient and answered all related questions prior to  discharge.     Impression & Plan     Medical Decision Makin-year-old female with recent C. difficile presents to the ED with recurrence of diarrhea and diffuse abdominal cramping.  Historical information is most concerning for recurrent C. difficile.  C. difficile testing sent although she may continue to have a positive test given her recent infection.  Patient will be provided with oral vancomycin for 2 weeks although may need extended course.  I recommend that she follow-up with infectious disease within the next 2 weeks to determine whether she will require further prolongation of vancomycin.  Patient safe for discharge home with additional supportive measures.    Diagnosis:    ICD-10-CM   1. C. difficile diarrhea A04.72   2. Abdominal pain, generalized R10.84       Disposition:  Discharged to home.    Discharge Medications:  New Prescriptions    HYDROCODONE-ACETAMINOPHEN (NORCO) 5-325 MG TABLET    Take 1 tablet by mouth every 6 hours as needed for severe pain    ONDANSETRON (ZOFRAN ODT) 4 MG ODT TAB    Take 1 tablet (4 mg) by mouth every 6 hours as needed for nausea    VANCOMYCIN (VANCOCIN) 125 MG CAPSULE    Take 1 capsule (125 mg) by mouth 4 times daily for 14 days     Scribe Disclosure:  Ceasar OJEDA, am serving as a scribe at 4:56 PM on 2019 to document services personally performed by Fitz Bro MD based on my observations and the provider's statements to me.      Fitz Bro MD  19 [phone]

## 2019-12-04 NOTE — ED NOTES
Pt provided with discharge paperwork and educated on recommended follow-up with PCP. Pt educated on how to manage symptoms at home and how to take prescribed antibiotics at home. Pt voiced understanding and denied any questions at discharge.

## 2019-12-05 ENCOUNTER — TELEPHONE (OUTPATIENT)
Dept: EMERGENCY MEDICINE | Facility: CLINIC | Age: 29
End: 2019-12-05

## 2019-12-05 NOTE — TELEPHONE ENCOUNTER
[facility] Emergency Department Lab result notification:    Lab Result  Final Clostridium difficile toxin B PCR is POSITIVE.  Toxin producing Clostridium difficile target DNA sequences detected, presumed positive for Clostridium difficile toxin B.   Rx (Flagyl or Vancomycin) prescribed upon discharge from the [place] ED/:  Vancomycin (VANCOCIN HCL) 125 MG capsule, 1 capsule (125 mg) by mouth 4 times daily for 14 days  If positive for C diff toxin B and treated appropriately, notify patient of results.  ED visit Date: 12/5/19  Symptoms reported at ED visit 29-year-old female with recent C. difficile presents to the ED with recurrence of diarrhea and diffuse abdominal cramping.  Historical information is most concerning for recurrent C. difficile.  C. difficile testing sent although she may continue to have a positive test given her recent infection.  Patient will be provided with oral vancomycin for 2 weeks although may need extended course.  I recommend that she follow-up with infectious disease within the next 2 weeks to determine whether she will require further prolongation of vancomycin.  Patient safe for discharge home with additional supportive measures.   Diagnosis:      ICD-10-CM   1. C. difficile diarrhea A04.72   2. Abdominal pain, generalized R10.84      Miscellaneous information Fitz Bro MD  12/04/19 1718     Current symptoms  1:28 pm Message left to call us back at [phone], between 10 am and 6 pm, seven days a week. May leave a message 24/7, if no one available.     RN Assessment (Patient s current Symptoms), include time called.  [Insert Left message here if message left]  1:58 pm Pt returned my call and I gave her the positive clostridium lab value. 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 ED put her on Vancomycin QID for 14 days.     RN Recommendations/Instructions per [place] ED lab result protocol  Patient notified of lab result  and treatment recommendations.  She had no questions regarding clostridium as she is well versed in it. I gave her the low lipase lab from her visit. To ask her PCP all questions regarding this.     Please Contact your PCP clinic or return to the Emergency department if your:    Symptoms return.    Symptoms do not improve after 3 days on antibiotic.    Symptoms do not resolve after completing antibiotic.    Symptoms worsen or other concerning symptom's.    PCP follow-up Questions asked: YES       Nelly Ibarra RN  [facility] [place]   Lung Nodule and ED Lab Result F/U RN  Epic pool (ED late result f/u RN): P [id number]  # [phone]

## 2020-04-28 NOTE — PROGRESS NOTES
"Ashley Cordon is a 29 year old female who is being evaluated via a billable telephone visit.      The patient has been notified of following:     \"This telephone visit will be conducted via a call between you and your physician/provider. We have found that certain health care needs can be provided without the need for a physical exam.  This service lets us provide the care you need with a short phone conversation.  If a prescription is necessary we can send it directly to your pharmacy.  If lab work is needed we can place an order for that and you can then stop by our lab to have the test done at a later time.    Telephone visits are billed at different rates depending on your insurance coverage. During this emergency period, for some insurers they may be billed the same as an in-person visit.  Please reach out to your insurance provider with any questions.    If during the course of the call the physician/provider feels a telephone visit is not appropriate, you will not be charged for this service.\"    Patient has given verbal consent for Telephone visit?  Yes    How would you like to obtain your AVS? [employer] sent sign up    Phone call duration: 20 minutes    Katherine Jorge MD        SUBJECTIVE:                                                   Ashley Cordon is a 29 year old female who presents to clinic today for the following health issue(s):  Patient presents with:  Derm Problem: ongoing rash for a year, comes out more in warm weather, does not itch. Located on upper chest and collar bone and lower back. Tried cortisone cream without relief. Just feels it's been going on too long.      HPI:  Patient is presenting for phone visit.  She has rash on her chest. She has blanching rash.  She states darker warm weather.  No itching.  No fevers or chills.      She is a resp therapist.  fYn PA Jerilyn.    Patient's last menstrual period was 04/08/2020 (approximate)..   Patient is sexually active, " ".  Using nothing for contraception, thinking about a pregnancy   reports that she has never smoked. She has never used smokeless tobacco.  Health maintenance updated:  yes      Problem list and histories reviewed & adjusted, as indicated.  Additional history: as documented.    Patient Active Problem List   Diagnosis      delivery delivered     KAI III (cervical intraepithelial neoplasia grade III) with severe dysplasia     Missed      Past Surgical History:   Procedure Laterality Date      SECTION N/A 9/15/2017    Procedure:  SECTION;;  Surgeon: Katherine Douglass MD;  Location: [facility]     D & C  ,       Social History     Tobacco Use     Smoking status: Never Smoker     Smokeless tobacco: Never Used   Substance Use Topics     Alcohol use: No      Problem (# of Occurrences) Relation (Name,Age of Onset)    Breast Cancer (1) Maternal Grandmother: 50    Myocardial Infarction (1) Maternal Grandfather    No Known Problems (2) Mother, Father    Uterine Cancer (1) Maternal Grandmother            Current Outpatient Medications   Medication Sig     ASPIRIN PO      cholecalciferol (VITAMIN D3) 125 MCG (5000 UT) TABS tablet Take by mouth daily     clotrimazole (LOTRIMIN) 1 % external cream Apply topically 2 times daily for 9 days     DULoxetine (CYMBALTA) 60 MG capsule Take 1 capsule by mouth     fluconazole (DIFLUCAN) 150 MG tablet Take 1 tablet (150 mg) by mouth daily for 3 days     vitamin B-Complex Take 1 tablet by mouth daily     vitamin B complex with vitamin C (VITAMIN  B COMPLEX) TABS tablet Take 1 tablet by mouth daily     No current facility-administered medications for this visit.      Allergies   Allergen Reactions     No Known Allergies        ROS:  12 point review of systems negative other than symptoms noted below or in the HPI.  Skin: Rash  No urinary frequency or dysuria, bladder or kidney problems      OBJECTIVE:     Ht 1.562 m (5' 1.5\")   Wt 54.4 " kg (120 lb)   LMP 04/08/2020 (Approximate)   BMI 22.31 kg/m    Body mass index is 22.31 kg/m .    Exam:  Neurologic:  Mental Status:  Oriented X3.      In-Clinic Test Results:  No results found for this or any previous visit (from the past 24 hour(s)).    ASSESSMENT/PLAN:                                                        ICD-10-CM    1. Fungal infection  B49 clotrimazole (LOTRIMIN) 1 % external cream     fluconazole (DIFLUCAN) 150 MG tablet       There are no Patient Instructions on file for this visit.    1. Fungal Infection- She likely has ringworm.  Will trial lotrimin cream and diflucan.  2. PCOS- cycles.  Sent provera to pharmacy and can induce cycle every 3 months.   Discussed potential metformin and cycles.  Discussed femara in the future once covid is handled.  Discussed conception planning in light of covid.    20 minutes was spent face to face with the patient today discussing her history, diagnosis, and follow-up plan as noted above. Over 50% of the visit was spent in counseling and coordination of care.      Katherine Jorge MD  [facility]

## 2020-04-29 ENCOUNTER — VIRTUAL VISIT (OUTPATIENT)
Dept: OBGYN | Facility: CLINIC | Age: 30
End: 2020-04-29
Payer: COMMERCIAL

## 2020-04-29 VITALS — BODY MASS INDEX: 22.08 KG/M2 | WEIGHT: 120 LBS | HEIGHT: 62 IN

## 2020-04-29 DIAGNOSIS — E28.2 PCOS (POLYCYSTIC OVARIAN SYNDROME): ICD-10-CM

## 2020-04-29 DIAGNOSIS — B49 FUNGAL INFECTION: Primary | ICD-10-CM

## 2020-04-29 PROCEDURE — 99213 OFFICE O/P EST LOW 20 MIN: CPT | Mod: 95 | Performed by: OBSTETRICS & GYNECOLOGY

## 2020-04-29 RX ORDER — FLUCONAZOLE 150 MG/1
150 TABLET ORAL DAILY
Qty: 3 TABLET | Refills: 0 | Status: SHIPPED | OUTPATIENT
Start: 2020-04-29 | End: 2020-08-13

## 2020-04-29 RX ORDER — MEDROXYPROGESTERONE ACETATE 10 MG
10 TABLET ORAL DAILY
Qty: 30 TABLET | Refills: 3 | Status: SHIPPED | OUTPATIENT
Start: 2020-04-29 | End: 2020-09-24

## 2020-04-29 RX ORDER — CLOTRIMAZOLE 1 %
CREAM (GRAM) TOPICAL 2 TIMES DAILY
Qty: 32 G | Refills: 3 | Status: SHIPPED | OUTPATIENT
Start: 2020-04-29 | End: 2020-08-13

## 2020-04-29 ASSESSMENT — MIFFLIN-ST. JEOR: SCORE: 1209.63

## 2020-07-15 ENCOUNTER — TELEPHONE (OUTPATIENT)
Dept: OBGYN | Facility: CLINIC | Age: 30
End: 2020-07-15

## 2020-07-15 DIAGNOSIS — F32.A DEPRESSION: Primary | ICD-10-CM

## 2020-07-15 NOTE — TELEPHONE ENCOUNTER
Requested Prescriptions   Pending Prescriptions Disp Refills     DULoxetine (CYMBALTA) 60 MG capsule       Sig: Take 1 capsule (60 mg) by mouth       There is no refill protocol information for this order        Last Written Prescription Date:  12/28/2018    Last office visit: Visit date not found with prescribing provider:  Luisito   Future Office Visit:   Next 5 appointments (look out 90 days)    Aug 25, 2020  1:00 PM CDT  PHYSICAL with Katherine Jorge MD  [facility] [address] [place] [id number]  [phone]           Routing to provider to advise. Not prescribed from this clinic  Monica Retana RN on 7/15/2020 at 4:25 PM

## 2020-07-15 NOTE — TELEPHONE ENCOUNTER
Patient needs the updated prescription for this medication ordered. Pharmacy said the one sent was .

## 2020-07-16 RX ORDER — DULOXETIN HYDROCHLORIDE 60 MG/1
60 CAPSULE, DELAYED RELEASE ORAL DAILY
Qty: 90 CAPSULE | Refills: 4 | Status: SHIPPED | OUTPATIENT
Start: 2020-07-16 | End: 2020-09-24 | Stop reason: DRUGHIGH

## 2020-08-13 ENCOUNTER — OFFICE VISIT (OUTPATIENT)
Dept: OBGYN | Facility: CLINIC | Age: 30
End: 2020-08-13
Payer: COMMERCIAL

## 2020-08-13 VITALS
BODY MASS INDEX: 22.63 KG/M2 | HEIGHT: 62 IN | DIASTOLIC BLOOD PRESSURE: 62 MMHG | SYSTOLIC BLOOD PRESSURE: 114 MMHG | WEIGHT: 123 LBS

## 2020-08-13 DIAGNOSIS — Z01.419 ENCOUNTER FOR GYNECOLOGICAL EXAMINATION WITHOUT ABNORMAL FINDING: Primary | ICD-10-CM

## 2020-08-13 DIAGNOSIS — Z11.4 SCREENING FOR HIV (HUMAN IMMUNODEFICIENCY VIRUS): ICD-10-CM

## 2020-08-13 DIAGNOSIS — Z11.8 SCREENING FOR CHLAMYDIAL DISEASE: ICD-10-CM

## 2020-08-13 DIAGNOSIS — Z11.3 SCREEN FOR STD (SEXUALLY TRANSMITTED DISEASE): ICD-10-CM

## 2020-08-13 PROCEDURE — 86803 HEPATITIS C AB TEST: CPT | Performed by: OBSTETRICS & GYNECOLOGY

## 2020-08-13 PROCEDURE — 36415 COLL VENOUS BLD VENIPUNCTURE: CPT | Performed by: OBSTETRICS & GYNECOLOGY

## 2020-08-13 PROCEDURE — 86695 HERPES SIMPLEX TYPE 1 TEST: CPT | Performed by: OBSTETRICS & GYNECOLOGY

## 2020-08-13 PROCEDURE — 86780 TREPONEMA PALLIDUM: CPT | Performed by: OBSTETRICS & GYNECOLOGY

## 2020-08-13 PROCEDURE — 87340 HEPATITIS B SURFACE AG IA: CPT | Performed by: OBSTETRICS & GYNECOLOGY

## 2020-08-13 PROCEDURE — 86696 HERPES SIMPLEX TYPE 2 TEST: CPT | Performed by: OBSTETRICS & GYNECOLOGY

## 2020-08-13 PROCEDURE — 87389 HIV-1 AG W/HIV-1&-2 AB AG IA: CPT | Performed by: OBSTETRICS & GYNECOLOGY

## 2020-08-13 PROCEDURE — 99395 PREV VISIT EST AGE 18-39: CPT | Performed by: OBSTETRICS & GYNECOLOGY

## 2020-08-13 RX ORDER — DULOXETIN HYDROCHLORIDE 30 MG/1
30 CAPSULE, DELAYED RELEASE ORAL 2 TIMES DAILY
Qty: 90 CAPSULE | Refills: 3 | Status: SHIPPED | OUTPATIENT
Start: 2020-08-13 | End: 2020-11-17

## 2020-08-13 ASSESSMENT — ANXIETY QUESTIONNAIRES
IF YOU CHECKED OFF ANY PROBLEMS ON THIS QUESTIONNAIRE, HOW DIFFICULT HAVE THESE PROBLEMS MADE IT FOR YOU TO DO YOUR WORK, TAKE CARE OF THINGS AT HOME, OR GET ALONG WITH OTHER PEOPLE: SOMEWHAT DIFFICULT
GAD7 TOTAL SCORE: 7
1. FEELING NERVOUS, ANXIOUS, OR ON EDGE: MORE THAN HALF THE DAYS
2. NOT BEING ABLE TO STOP OR CONTROL WORRYING: SEVERAL DAYS
5. BEING SO RESTLESS THAT IT IS HARD TO SIT STILL: SEVERAL DAYS
7. FEELING AFRAID AS IF SOMETHING AWFUL MIGHT HAPPEN: SEVERAL DAYS
3. WORRYING TOO MUCH ABOUT DIFFERENT THINGS: SEVERAL DAYS
6. BECOMING EASILY ANNOYED OR IRRITABLE: NOT AT ALL

## 2020-08-13 ASSESSMENT — PATIENT HEALTH QUESTIONNAIRE - PHQ9
5. POOR APPETITE OR OVEREATING: SEVERAL DAYS
SUM OF ALL RESPONSES TO PHQ QUESTIONS 1-9: 3

## 2020-08-13 ASSESSMENT — MIFFLIN-ST. JEOR: SCORE: 1222.2

## 2020-08-13 NOTE — PROGRESS NOTES
Ashley is a 30 year old  female who presents for annual exam.     Besides routine health maintenance,  she would like to discuss PCOS.    HPI:  The patient's PCP is Katherine Jorge MD.          GYNECOLOGIC HISTORY:    Patient's last menstrual period was 2020 (exact date).    Regular menses? no  Length of menses: 5-7 days    Her current contraception method is: none.  She  reports that she has never smoked. She has never used smokeless tobacco.    Patient is sexually active.  STD testing offered?  Accepted  Last PHQ-9 score on record =   PHQ-9 SCORE 2020   PHQ-9 Total Score 3     Last GAD7 score on record =   LIYA-7 SCORE 2020   Total Score 7     Alcohol Score = 2    HEALTH MAINTENANCE:  Cholesterol: (No results found for: CHOL   Last Mammo: Not applicable, Result: Not applicable, Next Mammo: Due at age 39   Pap: 19 WNL HPV (-)neg  Colonoscopy:  NA, Result: Not applicable, Next Colonoscopy: NA years.  Dexa:  NA    Health maintenance updated:  yes    HISTORY:  OB History    Para Term  AB Living   3 1 1 0 2 1   SAB TAB Ectopic Multiple Live Births   2 0 0 0 1      # Outcome Date GA Lbr Gerald/2nd Weight Sex Delivery Anes PTL Lv   3 Term 09/15/17 40w3d  3.374 kg (7 lb 7 oz) F   N ROBLES      Name: May      Apgar1: 9  Apgar5: 9   2 SAB      AB, MISSED      1 2015     AB, MISSED          Patient Active Problem List   Diagnosis      delivery delivered     KAI III (cervical intraepithelial neoplasia grade III) with severe dysplasia     Missed      Past Surgical History:   Procedure Laterality Date      SECTION N/A 9/15/2017    Procedure:  SECTION;;  Surgeon: Katherine Douglass MD;  Location:  L+D     D & C  ,       Social History     Tobacco Use     Smoking status: Never Smoker     Smokeless tobacco: Never Used   Substance Use Topics     Alcohol use: No      Problem (# of Occurrences) Relation (Name,Age of Onset)     "Breast Cancer (1) Maternal Grandmother: 50    Myocardial Infarction (1) Maternal Grandfather    No Known Problems (2) Mother, Father    Uterine Cancer (1) Maternal Grandmother            Current Outpatient Medications   Medication Sig     ASPIRIN PO      cholecalciferol (VITAMIN D3) 125 MCG (5000 UT) TABS tablet Take by mouth daily     DULoxetine (CYMBALTA) 60 MG capsule Take 1 capsule (60 mg) by mouth daily     medroxyPROGESTERone (PROVERA) 10 MG tablet Take 1 tablet (10 mg) by mouth daily     vitamin B complex with vitamin C (VITAMIN  B COMPLEX) TABS tablet Take 1 tablet by mouth daily     No current facility-administered medications for this visit.      Allergies   Allergen Reactions     No Known Allergies        Past medical, surgical, social and family histories were reviewed and updated in EPIC.    ROS:   12 point review of systems negative other than symptoms noted below or in the HPI.  Genitourinary: Irregular Menses  No urinary frequency or dysuria, bladder or kidney problems    EXAM:  /62   Ht 1.568 m (5' 1.75\")   Wt 55.8 kg (123 lb)   LMP 06/08/2020 (Exact Date)   Breastfeeding No   BMI 22.68 kg/m     BMI: Body mass index is 22.68 kg/m .    PHYSICAL EXAM:  Constitutional:   Appearance: Well nourished, well developed, alert, in no acute distress  Neck:  Lymph Nodes:  No lymphadenopathy present    Thyroid:  Gland size normal, nontender, no nodules or masses present  on palpation  Chest:  Respiratory Effort:  Breathing unlabored  Cardiovascular:    Heart: Auscultation:  Regular rate, normal rhythm, no murmurs present  Breasts: Inspection of Breasts:  No lymphadenopathy present., Palpation of Breasts and Axillae:  No masses present on palpation, no breast tenderness., Axillary Lymph Nodes:  No lymphadenopathy present. and No nodularity, asymmetry or nipple discharge bilaterally.  Gastrointestinal:   Abdominal Examination:  Abdomen nontender to palpation, tone normal without rigidity or guarding, no " masses present, umbilicus without lesions   Liver and Spleen:  No hepatomegaly present, liver nontender to palpation    Hernias:  No hernias present  Lymphatic: Lymph Nodes:  No other lymphadenopathy present  Skin:  General Inspection:  No rashes present, no lesions present, no areas of  discoloration  Neurologic:    Mental Status:  Oriented X3.  Normal strength and tone, sensory exam                grossly normal, mentation intact and speech normal.    Psychiatric:   Mentation appears normal and affect normal/bright.         Pelvic Exam:  External Genitalia:     Normal appearance for age, no discharge present, no tenderness present, no inflammatory lesions present, color normal  Vagina:     Normal vaginal vault without central or paravaginal defects, no discharge present, no inflammatory lesions present, no masses present  Bladder:     Nontender to palpation  Urethra:   Urethral Body:  Urethra palpation normal, urethra structural support normal   Urethral Meatus:  No erythema or lesions present  Cervix:     Appearance healthy, no lesions present, nontender to palpation, no bleeding present  Uterus:     Uterus: firm, normal sized and nontender,  Adnexa:     No adnexal tenderness present, no adnexal masses present  Perineum:     Perineum within normal limits, no evidence of trauma, no rashes or skin lesions present  Anus:     Anus within normal limits, no hemorrhoids present  Inguinal Lymph Nodes:     No lymphadenopathy present  Pubic Hair:     Normal pubic hair distribution for age  Genitalia and Groin:     No rashes present, no lesions present, no areas of discoloration, no masses present      COUNSELING:   Reviewed preventive health counseling, as reflected in patient instructions    BMI: Body mass index is 22.68 kg/m .      ASSESSMENT:  30 year old female with satisfactory annual exam.    ICD-10-CM    1. Encounter for gynecological examination without abnormal finding  Z01.419        PLAN:  1. PCOS- irregular  cycles.  Discussed with conception, plan on metformin 500 mg BID.  2. Anxiety/Depression- cymbalta 30 mg daily.  3. She will call when she wants to start cycles    Katherine Jorge MD

## 2020-08-14 ASSESSMENT — ANXIETY QUESTIONNAIRES: GAD7 TOTAL SCORE: 7

## 2020-08-15 LAB
HBV SURFACE AG SERPL QL IA: NONREACTIVE
HCV AB SERPL QL IA: NONREACTIVE
HIV 1+2 AB+HIV1 P24 AG SERPL QL IA: NONREACTIVE
T PALLIDUM AB SER QL: NONREACTIVE

## 2020-08-17 LAB
HSV1 IGG SERPL QL IA: <0.2 AI (ref 0–0.8)
HSV2 IGG SERPL QL IA: <0.2 AI (ref 0–0.8)

## 2020-09-04 ENCOUNTER — MYC MEDICAL ADVICE (OUTPATIENT)
Dept: OBGYN | Facility: CLINIC | Age: 30
End: 2020-09-04

## 2020-09-04 DIAGNOSIS — E28.2 PCOS (POLYCYSTIC OVARIAN SYNDROME): Primary | ICD-10-CM

## 2020-09-11 NOTE — TELEPHONE ENCOUNTER
8/13/20 OV annual exam  9/21/20 US and OV with Dr Jorge    Routing pt Service at Home message to provider to advise. Please read entire Service at Home string of messages back and forth.    Abena Randall RN on 9/11/2020 at 8:20 AM

## 2020-09-21 ENCOUNTER — ANCILLARY PROCEDURE (OUTPATIENT)
Dept: ULTRASOUND IMAGING | Facility: CLINIC | Age: 30
End: 2020-09-21
Attending: OBSTETRICS & GYNECOLOGY
Payer: COMMERCIAL

## 2020-09-21 DIAGNOSIS — E28.2 PCOS (POLYCYSTIC OVARIAN SYNDROME): ICD-10-CM

## 2020-09-21 PROCEDURE — 76830 TRANSVAGINAL US NON-OB: CPT | Performed by: OBSTETRICS & GYNECOLOGY

## 2020-09-24 ENCOUNTER — TELEPHONE (OUTPATIENT)
Dept: OBGYN | Facility: CLINIC | Age: 30
End: 2020-09-24

## 2020-09-24 ENCOUNTER — PREP FOR PROCEDURE (OUTPATIENT)
Dept: OBGYN | Facility: CLINIC | Age: 30
End: 2020-09-24

## 2020-09-24 ENCOUNTER — OFFICE VISIT (OUTPATIENT)
Dept: OBGYN | Facility: CLINIC | Age: 30
End: 2020-09-24
Payer: COMMERCIAL

## 2020-09-24 VITALS
SYSTOLIC BLOOD PRESSURE: 90 MMHG | HEIGHT: 61 IN | WEIGHT: 121.4 LBS | DIASTOLIC BLOOD PRESSURE: 60 MMHG | BODY MASS INDEX: 22.92 KG/M2

## 2020-09-24 DIAGNOSIS — N84.0 ENDOMETRIAL POLYP: ICD-10-CM

## 2020-09-24 DIAGNOSIS — N93.9 ABNORMAL UTERINE BLEEDING: Primary | ICD-10-CM

## 2020-09-24 DIAGNOSIS — E28.2 PCOS (POLYCYSTIC OVARIAN SYNDROME): Primary | ICD-10-CM

## 2020-09-24 PROCEDURE — 99213 OFFICE O/P EST LOW 20 MIN: CPT | Performed by: OBSTETRICS & GYNECOLOGY

## 2020-09-24 ASSESSMENT — MIFFLIN-ST. JEOR: SCORE: 1203.05

## 2020-09-24 NOTE — TELEPHONE ENCOUNTER
ASSIST: na    H&P TO BE COMPLETED BY:   Surgeon  FOR H&P TO BE DONE BY SURGEON   UPDATE VISIT ON DATE AT HOSPITAL  SAME DAY/OBSERVATION/INPATIENT: na    EQUIPMENT: myosure  VENDOR NEEDED AT CASE: myosure  LABS/SPECIAL INSTRUCTIONS: hcg, cbc, type and screen  TIME OFF WORK: usual 2 days

## 2020-09-24 NOTE — PROGRESS NOTES
SUBJECTIVE:                                                   Ashley Cordon is a 30 year old female who presents to clinic today for the following health issue(s):  Patient presents with:  Follow Up: US results      Additional information:     HPI:  Patient with bloating and bleeding.    She has possible polyps.  Some bloating.    Patient's last menstrual period was 2020 (exact date)..     Patient is sexually active, .  Using none for contraception.    reports that she has never smoked. She has never used smokeless tobacco.    STD testing offered?  Declined    Health maintenance updated:  no    Today's PHQ-2 Score: No flowsheet data found.  Today's PHQ-9 Score:   PHQ-9 SCORE 2020   PHQ-9 Total Score 3     Today's LIYA-7 Score:   LIYA-7 SCORE 2020   Total Score 7       Problem list and histories reviewed & adjusted, as indicated.  Additional history: as documented.    Patient Active Problem List   Diagnosis      delivery delivered     KAI III (cervical intraepithelial neoplasia grade III) with severe dysplasia     Missed      Past Surgical History:   Procedure Laterality Date      SECTION N/A 9/15/2017    Procedure:  SECTION;;  Surgeon: Katherine Douglass MD;  Location:  [facility]D     D & C  ,       Social History     Tobacco Use     Smoking status: Never Smoker     Smokeless tobacco: Never Used   Substance Use Topics     Alcohol use: No      Problem (# of Occurrences) Relation (Name,Age of Onset)    Breast Cancer (1) Maternal Grandmother: 50    Myocardial Infarction (1) Maternal Grandfather    No Known Problems (2) Mother, Father    Uterine Cancer (1) Maternal Grandmother            Current Outpatient Medications   Medication Sig     ASPIRIN PO      cholecalciferol (VITAMIN D3) 125 MCG (5000 UT) TABS tablet Take by mouth daily     DULoxetine (CYMBALTA) 30 MG capsule Take 1 capsule (30 mg) by mouth 2 times daily     metFORMIN (GLUCOPHAGE) 500  "MG tablet Take 1 tablet (500 mg) by mouth 2 times daily (with meals)     vitamin B complex with vitamin C (VITAMIN  B COMPLEX) TABS tablet Take 1 tablet by mouth daily     No current facility-administered medications for this visit.      Allergies   Allergen Reactions     No Known Allergies        ROS:  12 point review of systems negative other than symptoms noted below or in the HPI.  No urinary frequency or dysuria, bladder or kidney problems      OBJECTIVE:     BP 90/60   Ht 1.549 m (5' 1\")   Wt 55.1 kg (121 lb 6.4 oz)   LMP 08/13/2020 (Exact Date)   Breastfeeding No   BMI 22.94 kg/m    Body mass index is 22.94 kg/m .    Exam:  Constitutional:  Appearance: Well nourished, well developed alert, in no acute distress  Chest:  Respiratory Effort:  Breathing unlabored.   Cardiovascular: no edema    In-Clinic Test Results:  No results found for this or any previous visit (from the past 24 hour(s)).    ASSESSMENT/PLAN:                                                        ICD-10-CM    1. PCOS (polycystic ovarian syndrome)  E28.2    2. Endometrial polyp  N84.0        There are no Patient Instructions on file for this visit.    1. HSC, D and C, myosure    Katherine Jorge MD  [facility]  "

## 2020-09-25 NOTE — TELEPHONE ENCOUNTER
Attempted call to patient. No answer. Did not leave VM as am working from home today.    Marley Barrios  Surgery Scheduler

## 2020-09-28 DIAGNOSIS — Z11.59 ENCOUNTER FOR SCREENING FOR OTHER VIRAL DISEASES: Primary | ICD-10-CM

## 2020-09-28 PROBLEM — N93.9 ABNORMAL UTERINE BLEEDING: Status: ACTIVE | Noted: 2020-09-28

## 2020-09-28 NOTE — TELEPHONE ENCOUNTER
[place] [facility]  Location of surgery: [facility]  Date and time of surgery: 10/21/20 11:00-11:40  Surgeon: Luisito  Pre-Op Appt Date: will update at Hosp  Post-Op Appt Date:    Packet sent out: mailed 9/28/20  Pre-cert/Authorization completed:    Date: ramses 9/28    Aware of covid testing

## 2020-09-28 NOTE — TELEPHONE ENCOUNTER
Patient left message asking to have case moved to 10/14. Per Ce, nothing available. Patient notified and will stick with 10/21.

## 2020-10-07 ENCOUNTER — MYC MEDICAL ADVICE (OUTPATIENT)
Dept: OBGYN | Facility: CLINIC | Age: 30
End: 2020-10-07

## 2020-10-08 NOTE — TELEPHONE ENCOUNTER
She can do MAC, don't know if she would be up for watching it.  She can if she wants, or we can try.  She should tell the anesthesia people when they see her as well.

## 2020-10-19 DIAGNOSIS — Z11.59 ENCOUNTER FOR SCREENING FOR OTHER VIRAL DISEASES: ICD-10-CM

## 2020-10-19 PROCEDURE — U0003 INFECTIOUS AGENT DETECTION BY NUCLEIC ACID (DNA OR RNA); SEVERE ACUTE RESPIRATORY SYNDROME CORONAVIRUS 2 (SARS-COV-2) (CORONAVIRUS DISEASE [COVID-19]), AMPLIFIED PROBE TECHNIQUE, MAKING USE OF HIGH THROUGHPUT TECHNOLOGIES AS DESCRIBED BY CMS-2020-01-R: HCPCS | Performed by: OBSTETRICS & GYNECOLOGY

## 2020-10-20 LAB
SARS-COV-2 RNA SPEC QL NAA+PROBE: NOT DETECTED
SPECIMEN SOURCE: NORMAL

## 2020-10-20 NOTE — TELEPHONE ENCOUNTER
Due to other case moving we have moved the time to 9am arrival time 7am   Spoke w/Sherie at [facility] for changes.    Confirmed with patient for change    Marley Barrios  Surgery Scheduler

## 2020-10-21 ENCOUNTER — HOSPITAL ENCOUNTER (OUTPATIENT)
Facility: CLINIC | Age: 30
Discharge: HOME | End: 2020-10-21
Attending: OBSTETRICS & GYNECOLOGY | Admitting: OBSTETRICS & GYNECOLOGY
Payer: COMMERCIAL

## 2020-10-21 ENCOUNTER — ANESTHESIA (OUTPATIENT)
Dept: SURGERY | Facility: CLINIC | Age: 30
End: 2020-10-21
Payer: COMMERCIAL

## 2020-10-21 ENCOUNTER — ANESTHESIA EVENT (OUTPATIENT)
Dept: SURGERY | Facility: CLINIC | Age: 30
End: 2020-10-21
Payer: COMMERCIAL

## 2020-10-21 VITALS
HEART RATE: 72 BPM | OXYGEN SATURATION: 95 % | WEIGHT: 117 LBS | SYSTOLIC BLOOD PRESSURE: 109 MMHG | TEMPERATURE: 96.9 F | BODY MASS INDEX: 22.09 KG/M2 | HEIGHT: 61 IN | RESPIRATION RATE: 20 BRPM | DIASTOLIC BLOOD PRESSURE: 64 MMHG

## 2020-10-21 DIAGNOSIS — N93.9 ABNORMAL UTERINE BLEEDING: ICD-10-CM

## 2020-10-21 LAB
B-HCG SERPL-ACNC: <1 IU/L (ref 0–5)
ERYTHROCYTE [DISTWIDTH] IN BLOOD BY AUTOMATED COUNT: 12.4 % (ref 10–15)
HCT VFR BLD AUTO: 46.4 % (ref 35–47)
HGB BLD-MCNC: 15.9 G/DL (ref 11.7–15.7)
MCH RBC QN AUTO: 31.3 PG (ref 26.5–33)
MCHC RBC AUTO-ENTMCNC: 34.3 G/DL (ref 31.5–36.5)
MCV RBC AUTO: 91 FL (ref 78–100)
PLATELET # BLD AUTO: 239 10E9/L (ref 150–450)
RBC # BLD AUTO: 5.08 10E12/L (ref 3.8–5.2)
WBC # BLD AUTO: 5.3 10E9/L (ref 4–11)

## 2020-10-21 PROCEDURE — 761N000007 HC RECOVERY PHASE 2 EACH 15 MINS: Performed by: OBSTETRICS & GYNECOLOGY

## 2020-10-21 PROCEDURE — 88305 TISSUE EXAM BY PATHOLOGIST: CPT | Mod: 26 | Performed by: PATHOLOGY

## 2020-10-21 PROCEDURE — 360N000020 HC SURGERY LEVEL 3 1ST 30 MIN: Performed by: OBSTETRICS & GYNECOLOGY

## 2020-10-21 PROCEDURE — 250N000011 HC RX IP 250 OP 636: Performed by: NURSE ANESTHETIST, CERTIFIED REGISTERED

## 2020-10-21 PROCEDURE — 88305 TISSUE EXAM BY PATHOLOGIST: CPT | Mod: TC | Performed by: OBSTETRICS & GYNECOLOGY

## 2020-10-21 PROCEDURE — 761N000001 HC RECOVERY PHASE 1 LEVEL 1 FIRST HR: Performed by: OBSTETRICS & GYNECOLOGY

## 2020-10-21 PROCEDURE — 360N000021 HC SURGERY LEVEL 3 EA 15 ADDTL MIN: Performed by: OBSTETRICS & GYNECOLOGY

## 2020-10-21 PROCEDURE — 370N000002 HC ANESTHESIA TECHNICAL FEE, EACH ADDTL 15 MIN: Performed by: OBSTETRICS & GYNECOLOGY

## 2020-10-21 PROCEDURE — 84702 CHORIONIC GONADOTROPIN TEST: CPT | Performed by: OBSTETRICS & GYNECOLOGY

## 2020-10-21 PROCEDURE — 370N000001 HC ANESTHESIA TECHNICAL FEE, 1ST 30 MIN: Performed by: OBSTETRICS & GYNECOLOGY

## 2020-10-21 PROCEDURE — 36415 COLL VENOUS BLD VENIPUNCTURE: CPT | Performed by: OBSTETRICS & GYNECOLOGY

## 2020-10-21 PROCEDURE — 999N000139 HC STATISTIC PRE-PROCEDURE ASSESSMENT II: Performed by: OBSTETRICS & GYNECOLOGY

## 2020-10-21 PROCEDURE — 761N000002 HC RECOVERY PHASE 1 LEVEL 1 EA ADDTL HR: Performed by: OBSTETRICS & GYNECOLOGY

## 2020-10-21 PROCEDURE — 250N000013 HC RX MED GY IP 250 OP 250 PS 637: Performed by: OBSTETRICS & GYNECOLOGY

## 2020-10-21 PROCEDURE — 85027 COMPLETE CBC AUTOMATED: CPT | Performed by: OBSTETRICS & GYNECOLOGY

## 2020-10-21 PROCEDURE — 250N000009 HC RX 250: Performed by: OBSTETRICS & GYNECOLOGY

## 2020-10-21 PROCEDURE — 272N000001 HC OR GENERAL SUPPLY STERILE: Performed by: OBSTETRICS & GYNECOLOGY

## 2020-10-21 PROCEDURE — 58558 HYSTEROSCOPY BIOPSY: CPT | Performed by: OBSTETRICS & GYNECOLOGY

## 2020-10-21 PROCEDURE — 250N000009 HC RX 250: Performed by: NURSE ANESTHETIST, CERTIFIED REGISTERED

## 2020-10-21 PROCEDURE — 250N000011 HC RX IP 250 OP 636: Performed by: ANESTHESIOLOGY

## 2020-10-21 PROCEDURE — 258N000003 HC RX IP 258 OP 636: Performed by: NURSE ANESTHETIST, CERTIFIED REGISTERED

## 2020-10-21 RX ORDER — LIDOCAINE HYDROCHLORIDE 20 MG/ML
INJECTION, SOLUTION INFILTRATION; PERINEURAL PRN
Status: DISCONTINUED | OUTPATIENT
Start: 2020-10-21 | End: 2020-10-21

## 2020-10-21 RX ORDER — OXYCODONE HYDROCHLORIDE 5 MG/1
5-10 TABLET ORAL EVERY 4 HOURS PRN
Qty: 6 TABLET | Refills: 0 | Status: SHIPPED | OUTPATIENT
Start: 2020-10-21 | End: 2021-01-29

## 2020-10-21 RX ORDER — ACETAMINOPHEN 325 MG/1
975 TABLET ORAL ONCE
Status: COMPLETED | OUTPATIENT
Start: 2020-10-21 | End: 2020-10-21

## 2020-10-21 RX ORDER — KETOROLAC TROMETHAMINE 30 MG/ML
INJECTION, SOLUTION INTRAMUSCULAR; INTRAVENOUS PRN
Status: DISCONTINUED | OUTPATIENT
Start: 2020-10-21 | End: 2020-10-21

## 2020-10-21 RX ORDER — ONDANSETRON 4 MG/1
4 TABLET, ORALLY DISINTEGRATING ORAL EVERY 30 MIN PRN
Status: DISCONTINUED | OUTPATIENT
Start: 2020-10-21 | End: 2020-10-21 | Stop reason: HOSPADM

## 2020-10-21 RX ORDER — DEXAMETHASONE SODIUM PHOSPHATE 4 MG/ML
INJECTION, SOLUTION INTRA-ARTICULAR; INTRALESIONAL; INTRAMUSCULAR; INTRAVENOUS; SOFT TISSUE PRN
Status: DISCONTINUED | OUTPATIENT
Start: 2020-10-21 | End: 2020-10-21

## 2020-10-21 RX ORDER — IBUPROFEN 600 MG/1
600 TABLET, FILM COATED ORAL
Status: DISCONTINUED | OUTPATIENT
Start: 2020-10-21 | End: 2020-10-21 | Stop reason: HOSPADM

## 2020-10-21 RX ORDER — HYDROMORPHONE HYDROCHLORIDE 1 MG/ML
.3-.5 INJECTION, SOLUTION INTRAMUSCULAR; INTRAVENOUS; SUBCUTANEOUS EVERY 5 MIN PRN
Status: DISCONTINUED | OUTPATIENT
Start: 2020-10-21 | End: 2020-10-21 | Stop reason: HOSPADM

## 2020-10-21 RX ORDER — ONDANSETRON 2 MG/ML
INJECTION INTRAMUSCULAR; INTRAVENOUS PRN
Status: DISCONTINUED | OUTPATIENT
Start: 2020-10-21 | End: 2020-10-21

## 2020-10-21 RX ORDER — IBUPROFEN 600 MG/1
600 TABLET, FILM COATED ORAL EVERY 6 HOURS PRN
Qty: 30 TABLET | Refills: 0 | Status: SHIPPED | OUTPATIENT
Start: 2020-10-21 | End: 2021-01-29

## 2020-10-21 RX ORDER — ONDANSETRON 2 MG/ML
4 INJECTION INTRAMUSCULAR; INTRAVENOUS EVERY 30 MIN PRN
Status: DISCONTINUED | OUTPATIENT
Start: 2020-10-21 | End: 2020-10-21 | Stop reason: HOSPADM

## 2020-10-21 RX ORDER — PROPOFOL 10 MG/ML
INJECTION, EMULSION INTRAVENOUS CONTINUOUS PRN
Status: DISCONTINUED | OUTPATIENT
Start: 2020-10-21 | End: 2020-10-21

## 2020-10-21 RX ORDER — NALOXONE HYDROCHLORIDE 0.4 MG/ML
.1-.4 INJECTION, SOLUTION INTRAMUSCULAR; INTRAVENOUS; SUBCUTANEOUS
Status: DISCONTINUED | OUTPATIENT
Start: 2020-10-21 | End: 2020-10-21 | Stop reason: HOSPADM

## 2020-10-21 RX ORDER — OXYCODONE HYDROCHLORIDE 5 MG/1
5 TABLET ORAL
Status: COMPLETED | OUTPATIENT
Start: 2020-10-21 | End: 2020-10-21

## 2020-10-21 RX ORDER — SODIUM CHLORIDE, SODIUM LACTATE, POTASSIUM CHLORIDE, CALCIUM CHLORIDE 600; 310; 30; 20 MG/100ML; MG/100ML; MG/100ML; MG/100ML
INJECTION, SOLUTION INTRAVENOUS CONTINUOUS PRN
Status: DISCONTINUED | OUTPATIENT
Start: 2020-10-21 | End: 2020-10-21

## 2020-10-21 RX ORDER — PROPOFOL 10 MG/ML
INJECTION, EMULSION INTRAVENOUS PRN
Status: DISCONTINUED | OUTPATIENT
Start: 2020-10-21 | End: 2020-10-21

## 2020-10-21 RX ORDER — FENTANYL CITRATE 50 UG/ML
25-50 INJECTION, SOLUTION INTRAMUSCULAR; INTRAVENOUS
Status: DISCONTINUED | OUTPATIENT
Start: 2020-10-21 | End: 2020-10-21 | Stop reason: HOSPADM

## 2020-10-21 RX ORDER — ONDANSETRON 4 MG/1
4-8 TABLET, ORALLY DISINTEGRATING ORAL EVERY 8 HOURS PRN
Qty: 4 TABLET | Refills: 0 | Status: SHIPPED | OUTPATIENT
Start: 2020-10-21 | End: 2021-01-29

## 2020-10-21 RX ORDER — AMOXICILLIN 250 MG
1-2 CAPSULE ORAL 2 TIMES DAILY
Qty: 30 TABLET | Refills: 0 | Status: SHIPPED | OUTPATIENT
Start: 2020-10-21 | End: 2021-01-29

## 2020-10-21 RX ORDER — SODIUM CHLORIDE, SODIUM LACTATE, POTASSIUM CHLORIDE, CALCIUM CHLORIDE 600; 310; 30; 20 MG/100ML; MG/100ML; MG/100ML; MG/100ML
INJECTION, SOLUTION INTRAVENOUS CONTINUOUS
Status: DISCONTINUED | OUTPATIENT
Start: 2020-10-21 | End: 2020-10-21 | Stop reason: HOSPADM

## 2020-10-21 RX ADMIN — PROPOFOL 100 MG: 10 INJECTION, EMULSION INTRAVENOUS at 09:54

## 2020-10-21 RX ADMIN — FENTANYL CITRATE 50 MCG: 0.05 INJECTION, SOLUTION INTRAMUSCULAR; INTRAVENOUS at 11:49

## 2020-10-21 RX ADMIN — DEXAMETHASONE SODIUM PHOSPHATE 4 MG: 4 INJECTION, SOLUTION INTRA-ARTICULAR; INTRALESIONAL; INTRAMUSCULAR; INTRAVENOUS; SOFT TISSUE at 09:54

## 2020-10-21 RX ADMIN — ACETAMINOPHEN 975 MG: 325 TABLET, FILM COATED ORAL at 07:55

## 2020-10-21 RX ADMIN — PROPOFOL 200 MG: 10 INJECTION, EMULSION INTRAVENOUS at 09:50

## 2020-10-21 RX ADMIN — PROPOFOL 100 MG: 10 INJECTION, EMULSION INTRAVENOUS at 09:55

## 2020-10-21 RX ADMIN — SODIUM CHLORIDE, POTASSIUM CHLORIDE, SODIUM LACTATE AND CALCIUM CHLORIDE: 600; 310; 30; 20 INJECTION, SOLUTION INTRAVENOUS at 09:45

## 2020-10-21 RX ADMIN — PROPOFOL 150 MCG/KG/MIN: 10 INJECTION, EMULSION INTRAVENOUS at 09:50

## 2020-10-21 RX ADMIN — ONDANSETRON 4 MG: 2 INJECTION INTRAMUSCULAR; INTRAVENOUS at 09:54

## 2020-10-21 RX ADMIN — LIDOCAINE HYDROCHLORIDE 100 MG: 20 INJECTION, SOLUTION INFILTRATION; PERINEURAL at 09:50

## 2020-10-21 RX ADMIN — OXYCODONE HYDROCHLORIDE 5 MG: 5 TABLET ORAL at 11:44

## 2020-10-21 RX ADMIN — PROPOFOL 70 MG: 10 INJECTION, EMULSION INTRAVENOUS at 10:09

## 2020-10-21 RX ADMIN — KETOROLAC TROMETHAMINE 30 MG: 30 INJECTION, SOLUTION INTRAMUSCULAR at 10:14

## 2020-10-21 RX ADMIN — MIDAZOLAM 2 MG: 1 INJECTION INTRAMUSCULAR; INTRAVENOUS at 09:45

## 2020-10-21 ASSESSMENT — MIFFLIN-ST. JEOR: SCORE: 1183.09

## 2020-10-21 NOTE — ANESTHESIA POSTPROCEDURE EVALUATION
Patient: Ashley Cordon    Procedure(s):  HYSTEROSCOPY, DILATION AND CURETTAGE, MYOSURE MORCILLATOR    Diagnosis:Abnormal uterine bleeding [N93.9]  Diagnosis Additional Information: No value filed.    Anesthesia Type:  General    Note:  Anesthesia Post Evaluation    Patient location during evaluation: bedside  Patient participation: Able to fully participate in evaluation  Level of consciousness: awake and alert  Pain management: adequate  Airway patency: patent  Cardiovascular status: acceptable  Respiratory status: acceptable  Hydration status: acceptable  PONV: none and controlled     Anesthetic complications: None          Last vitals:  Vitals:    10/21/20 1115 10/21/20 1130 10/21/20 1145   BP: (!) 93/36 97/51 99/53   Pulse: 57 86 72   Resp: 19 24 23   Temp:      SpO2: 100% 99% 99%         Electronically Signed By: Larry Chatman MD  October 21, 2020  12:30 PM

## 2020-10-21 NOTE — OP NOTE
Ashley Cordon  1990  10/21/2020      Preop Dx: 1. Endometrial polyps    Postop Dx:  Same                          Procedure:  Dilatation, Curettage, Hysteroscopy, myosure    Anesthesia:  MAC    Surgeon:  Katherine Jorge MD    Procedure:  The patient was brought to the operating room where following general anesthesia was placed in the dorsolithotomy position where she was prepped and draped.      A speculum was placed.  The cervix was grasped with a tenaculum.  The cervix was dilated to accommodate the hysteroscope.  Under direct visualization the hysteroscope was placed into the endometrial cavity with the findings of endometrial polyps.  Myosure introduced.    The specimens were submitted to pathology for microscopic analysis.    The instruments were removed from the vagina and all areas were hemostatic.   The estimated blood loss was 10 cc.  All sponge, needle, and instrument counts were correct.  The patient was awakened and removed to the recovery room in stable condition.    Katherine Jorge MD  October 21, 2020

## 2020-10-21 NOTE — OR NURSING
Patient somnolent on PACU arrival, not responding to sternal rub, pupils pinpoint, breathing shallow but spontaneous, requiring jaw thrust to maintain airway, weak attempts at cough, BP 72/46, pulse 76. Head of bed elevated mildly, but bed in Trendelenburg, fluids wide open. Another nurse to bedside to assist with maintaining airway. Dr. Chatman, anesthesiologist, to bedside, BP 84/49, pulse 100 at that time. Dr. Chatman stated he was not concerned about BP as her pressures were lower pre-op.   As of 1055, patient able to maintain own airway, BP 88/48, pulse 76,

## 2020-10-21 NOTE — ANESTHESIA PREPROCEDURE EVALUATION
Procedure: Procedure(s):  HYSTEROSCOPY, DILATION AND CURETTAGE, MYOSURE MORCILLATOR  Preop diagnosis: Abnormal uterine bleeding [N93.9]    Allergies   Allergen Reactions     Fentanyl Nausea and Vomiting     Past Medical History:   Diagnosis Date     KAI III (cervical intraepithelial neoplasia grade III) with severe dysplasia      Depression      Missed       Seasonal allergies      Past Surgical History:   Procedure Laterality Date      SECTION N/A 9/15/2017    Procedure:  SECTION;;  Surgeon: Katherine Duoglass MD;  Location: [facility]     D & C  ,      wisdom teeth       Social History     Tobacco Use     Smoking status: Never Smoker     Smokeless tobacco: Never Used   Substance Use Topics     Alcohol use: Yes     Prior to Admission medications    Medication Sig Start Date End Date Taking? Authorizing Provider   ASPIRIN PO Take 81 mg by mouth daily    Yes Reported, Patient   cholecalciferol (VITAMIN D3) 125 MCG (5000 UT) TABS tablet Take 125 mcg by mouth daily    Yes Reported, Patient   DULoxetine (CYMBALTA) 30 MG capsule Take 1 capsule (30 mg) by mouth 2 times daily  Patient taking differently: Take 30 mg by mouth daily  20  Yes Katherine Douglass MD   vitamin B complex with vitamin C (VITAMIN  B COMPLEX) TABS tablet Take 1 tablet by mouth 2 times daily    Yes Reported, Patient     Current Facility-Administered Medications Ordered in Epic   Medication Dose Route Frequency Last Rate Last Dose     PRE OP antibiotics NOT needed for this surgical procedure  1 each As instructed Continuous         No current [facility]-ordered outpatient medications on file.       no pre procedure antibiotic needed       Wt Readings from Last 1 Encounters:   10/21/20 53.1 kg (117 lb)     Temp Readings from Last 1 Encounters:   10/21/20 36.9  C (98.4  F) (Oral)     BP Readings from Last 6 Encounters:   10/21/20 115/56   20 90/60   20 114/62   19 113/66   10/11/19  96/64   09/18/17 108/65     Pulse Readings from Last 4 Encounters:   12/04/19 81   10/11/19 99   09/17/17 93   05/06/17 97     Resp Readings from Last 1 Encounters:   10/21/20 16     SpO2 Readings from Last 1 Encounters:   10/21/20 95%     Recent Labs   Lab Test 12/04/19  1521 10/11/19  0841    138   POTASSIUM 3.4 3.6   CHLORIDE 105 106   CO2 28 26   ANIONGAP 4 6   GLC 83 102*   BUN 9 10   CR 0.73 0.74   LINDA 8.8 8.7     Recent Labs   Lab Test 12/04/19  1521 10/11/19  0841   AST 20 17   ALT 16 20   ALKPHOS 64 74   BILITOTAL 0.3 0.4   LIPASE 46*  --      Recent Labs   Lab Test 10/21/20  0728 12/04/19  1521   WBC 5.3 12.9*   HGB 15.9* 13.3    265     Recent Labs   Lab Test 09/15/17  0843   ABO O  O   RH Pos  Pos     No results for input(s): INR, PTT in the last 62388 hours.   No results for input(s): TROPI in the last 85177 hours.  No results for input(s): PH, PCO2, PO2, HCO3 in the last 16098 hours.  Recent Labs   Lab Test 12/04/19  1532   HCG Negative     Recent Results (from the past 744 hour(s))   US Transvaginal Non OB    Narrative    [facility]  ULTRASOUND - PELVIC GYN- Transvaginal     Referring MD: Gem Jorge     ===================================     CLINICAL INFORMATION     Indications for ultrasound:   PCOS     LMP: 15 Aug 2020    Hormones: none     Measurements:  Uterus:  6.1 x 4.6 x 2.9 cm     Position is anteverted.  Contour is smooth/regular.     Endo cav: 3.5 mm       Hyperechoic area 0.73 x 0.56 x 0.49cm     Right ovary: 4.1 x 3.8 x 2.4 cm  Mult follicles on periphery  Left ovary:   3.2  X 2.8 x 2.1 cm Mult follicles on periphery     Cul de sac: no free fluid     ===================================  Impression:     PCOS appearance of the ovaries.  Possible polyps present in the   endometrium vs. Other, appearance of calcification.    Katherine Jorge MD          RECENT LABS:   ECG:   ECHO:     Anesthesia Evaluation     . Pt has had prior anesthetic.     No  history of anesthetic complications          ROS/MED HX    ENT/Pulmonary:      (-) sleep apnea   Neurologic:  - neg neurologic ROS     Cardiovascular:         METS/Exercise Tolerance:     Hematologic:  - neg hematologic  ROS       Musculoskeletal:  - neg musculoskeletal ROS       GI/Hepatic:     (+) GERD       Renal/Genitourinary:     (+) Other Renal/ Genitourinary, failure to progress with labor      Endo:         Psychiatric:  - neg psychiatric ROS       Infectious Disease:         Malignancy:         Other:                     Physical Exam  Normal systems: cardiovascular, pulmonary and dental    Airway   Mallampati: II  TM distance: >3 FB  Neck ROM: full    Dental     Cardiovascular       Pulmonary                         Anesthesia Plan      History & Physical Review  History and physical reviewed and following examination; no interval change.    ASA Status:  2 .    NPO Status:  > 8 hours    Plan for General with Intravenous induction. Maintenance will be Balanced (LMA).    PONV prophylaxis:  Ondansetron (or other 5HT-3)         Postoperative Care  Postoperative pain management:  IV analgesics.      Consents  Anesthetic plan, risks, benefits and alternatives discussed with:  Patient.  Use of blood products discussed: Yes.   Use of blood products discussed with Patient.  .          Procedure: Procedure(s):   SECTION  Preop diagnosis: pregnancy    Allergies   Allergen Reactions     Fentanyl Nausea and Vomiting     Past Medical History:   Diagnosis Date     KAI III (cervical intraepithelial neoplasia grade III) with severe dysplasia      Depression      Missed       Seasonal allergies      Past Surgical History:   Procedure Laterality Date      SECTION N/A 9/15/2017    Procedure:  SECTION;;  Surgeon: Katherine Douglass MD;  Location: [facility]     D & C  ,      wisdom teeth       Prior to Admission medications    Medication Sig Start Date End Date Taking?  Authorizing Provider   potassium chloride (KLOR-CON) 20 MEQ Packet Take 20 mEq by mouth daily   Yes Reported, Patient   Prenatal Vit-Fe Fumarate-FA (PRENATAL MULTIVITAMIN  PLUS IRON) 27-0.8 MG TABS per tablet Take 1 tablet by mouth daily   Yes Reported, Patient   vitamin B complex with vitamin C (VITAMIN  B COMPLEX) TABS tablet Take 1 tablet by mouth daily   Yes Reported, Patient     Current Facility-Administered Medications Ordered in Epic   Medication Dose Route Frequency Last Rate Last Dose     PRE OP antibiotics NOT needed for this surgical procedure  1 each As instructed Continuous         No current [facility]-ordered outpatient medications on file.     Wt Readings from Last 1 Encounters:   10/21/20 53.1 kg (117 lb)     Temp Readings from Last 1 Encounters:   10/21/20 36.9  C (98.4  F) (Oral)     BP Readings from Last 6 Encounters:   10/21/20 115/56   09/24/20 90/60   08/13/20 114/62   12/04/19 113/66   10/11/19 96/64   09/18/17 108/65     Pulse Readings from Last 4 Encounters:   12/04/19 81   10/11/19 99   09/17/17 93   05/06/17 97     Resp Readings from Last 1 Encounters:   10/21/20 16     SpO2 Readings from Last 1 Encounters:   09/15/17 96%     No results for input(s): NA, POTASSIUM, CHLORIDE, CO2, ANIONGAP, GLC, BUN, CR, LINDA in the last 42487 hours.  No results for input(s): AST, ALT in the last 27920 hours.    Invalid input(s): ALP, BILT, LPSE  No results for input(s): WBC, HGB, PLT in the last 81187 hours.  No results for input(s): INR in the last 25895 hours.    Invalid input(s): APTT   No results for input(s): TROPI in the last 52081 hours.  RECENT LABS:   ECG:   ECHO:   CXR:                    .

## 2020-10-21 NOTE — DISCHARGE INSTRUCTIONS
Today you were given 975 mg of Tylenol at 0815. The recommended daily maximum dose is 4000 mg.     Today you received Toradol, an antiinflammatory medication similar to Ibuprofen.  You should not take other antiinflammatory medication, such as Ibuprofen, Motrin, Advil, Aleve, Naprosyn, etc until 3:30pm.       HOME CARE FOLLOWING DILATION AND CURETTAGE (D&C)    Diet  You have no restrictions on your diet.  During the evening following surgery, drink plenty of fluids and eat a light supper.    Nausea  The anesthesia may produce some nausea.  If you feel nauseated, stay in bed and try drinking fluids such as 7-Up, tea, or soup.    Discomfort  The amount of discomfort you can expect is very unpredictable.  If you have pain that cannot be controlled with Tylenol or with the prescription you may have received, you should notify your physician.  Abdominal cramping or low backache is not uncommon and should not be a cause for concern.  You will be drowsy and weak the day of surgery and possibly the following day.  Fever  A low grade fever (not over 100 F) is usual after this procedure.  Do not hesitate to notify your physician if your fever seems excessive or persists.    Activity   Rest on the day of surgery then you may resume your normal activity, as tolerated  Avoid heavy lifting for one week.    You may bathe or shower.  Do not douche, use tampons, or resume intercourse until the bleeding has ceased.    Emergency Care  Contact your physician if you have any of these problems:   1.  A fever over 100 F   2.  A large amount of bleeding or drainage   3.  Severe pain    [facility]  [phone]    Same Day Surgery Discharge Instructions for  Sedation and General Anesthesia       It's not unusual to feel dizzy, light-headed or faint for up to 24 hours after surgery or while taking pain medication.  If you have these symptoms: sit for a few minutes before standing and have someone assist you when you get up to  walk or use the bathroom.      You should rest and relax for the next 24 hours. We recommend you make arrangements to have an adult stay with you for at least 24 hours after your discharge.  Avoid hazardous and strenuous activity.      DO NOT DRIVE any vehicle or operate mechanical equipment for 24 hours following the end of your surgery.  Even though you may feel normal, your reactions may be affected by the medication you have received.      Do not drink alcoholic beverages for 24 hours following surgery.       Slowly progress to your regular diet as you feel able. It's not unusual to feel nauseated and/or vomit after receiving anesthesia.  If you develop these symptoms, drink clear liquids (apple juice, ginger ale, broth, 7-up, etc. ) until you feel better.  If your nausea and vomiting persists for 24 hours, please notify your surgeon.        All narcotic pain medications, along with inactivity and anesthesia, can cause constipation. Drinking plenty of liquids and increasing fiber intake will help.      For any questions of a medical nature, call your surgeon.      Do not make important decisions for 24 hours.      If you had general anesthesia, you may have a sore throat for a couple of days related to the breathing tube used during surgery.  You may use Cepacol lozenges to help with this discomfort.  If it worsens or if you develop a fever, contact your surgeon.       If you feel your pain is not well managed with the pain medications prescribed by your surgeon, please contact your surgeon's office to let them know so they can address your concerns.       CoVid 19 Information    We want to give you information regarding Covid. Please consult your primary care provider with any questions you might have.     Patient who have symptoms (cough, fever, or shortness of breath), need to isolate for 7 days from when symptoms started OR 72 hours after fever resolves (without fever reducing medications) AND improvement of  respiratory symptoms (whichever is longer).      Isolate yourself at home (in own room/own bathroom if possible)    Do Not allow any visitors    Do Not go to work or school    Do Not go to Orthodox,  centers, shopping, or other public places.    Do Not shake hands.    Avoid close and intimate contact with others (hugging, kissing).    Follow CDC recommendations for household cleaning of frequently touched services.     After the initial 7 days, continue to isolate yourself from household members as much as possible. To continue decrease the risk of community spread and exposure, you and any members of your household should limit activities in public for 14 days after starting home isolation.     You can reference the following CDC link for helpful home isolation/care tips:  https://www.cdc.gov/coronavirus/2019-ncov/downloads/10Things.pdf    Protect Others:    Cover Your Mouth and Nose with a mask, disposable tissue or wash cloth to avoid spreading germs to others.    Wash your hands and face frequently with soap and water    Call Your Primary Doctor If: Breathing difficulty develops or you become worse.    For more information about COVID19 and options for caring for yourself at home, please visit the CDC website at https://www.cdc.gov/coronavirus/2019-ncov/about/steps-when-sick.html  For more options for care at [facility], please visit our website at [email or web address]      .  .

## 2020-10-21 NOTE — ANESTHESIA CARE TRANSFER NOTE
Patient: Ashley Cordon    Procedure(s):  HYSTEROSCOPY, DILATION AND CURETTAGE, MYOSURE MORCILLATOR    Diagnosis: Abnormal uterine bleeding [N93.9]  Diagnosis Additional Information: No value filed.    Anesthesia Type:   General     Note:  Airway :Face Mask  Patient transferred to:PACU  Handoff Report: Identifed the Patient, Identified the Reponsible Provider, Reviewed the pertinent medical history, Discussed the surgical course, Reviewed Intra-OP anesthesia mangement and issues during anesthesia, Set expectations for post-procedure period and Allowed opportunity for questions and acknowledgement of understanding      Vitals: (Last set prior to Anesthesia Care Transfer)    CRNA VITALS  10/21/2020 0958 - 10/21/2020 1034      10/21/2020             Pulse:  82    SpO2:  98 %    Resp Rate (observed):  (!) 7                Electronically Signed By: LITTLE Cuellar CRNA  October 21, 2020  10:34 AM

## 2020-10-22 LAB — COPATH REPORT: NORMAL

## 2020-11-06 ENCOUNTER — MYC MEDICAL ADVICE (OUTPATIENT)
Dept: OBGYN | Facility: CLINIC | Age: 30
End: 2020-11-06

## 2020-11-06 NOTE — TELEPHONE ENCOUNTER
Routing pt Notorioushart message to provider to advise.  Monica Retana RN on 11/6/2020 at 1:50 PM

## 2020-11-17 DIAGNOSIS — Z01.419 ENCOUNTER FOR GYNECOLOGICAL EXAMINATION WITHOUT ABNORMAL FINDING: ICD-10-CM

## 2020-11-17 RX ORDER — DULOXETIN HYDROCHLORIDE 30 MG/1
30 CAPSULE, DELAYED RELEASE ORAL DAILY
Qty: 90 CAPSULE | Refills: 3 | Status: SHIPPED | OUTPATIENT
Start: 2020-11-17 | End: 2021-09-01

## 2020-11-17 NOTE — TELEPHONE ENCOUNTER
Prescription approved per [facility] according to the [facility] Refill Protocol.  Monica Retana RN on 11/17/2020 at 2:19 PM

## 2020-11-17 NOTE — TELEPHONE ENCOUNTER
"Requested Prescriptions   Pending Prescriptions Disp Refills     DULoxetine (CYMBALTA) 30 MG capsule 90 capsule 3     Sig: Take 1 capsule (30 mg) by mouth 2 times daily       Serotonin-Norepinephrine Reuptake Inhibitors  Passed - 11/17/2020  1:36 PM        Passed - Blood pressure under 140/90 in past 12 months     BP Readings from Last 3 Encounters:   10/21/20 109/64   09/24/20 90/60   08/13/20 114/62                 Passed - Recent (12 mo) or future (30 days) visit within the authorizing provider's specialty     Patient has had an office visit with the authorizing provider or a provider within the authorizing providers department within the previous 12 mos or has a future within next 30 days. See \"Patient Info\" tab in inbasket, or \"Choose Columns\" in Meds & Orders section of the refill encounter.              Passed - Medication is active on med list        Passed - Patient is age 18 or older        Passed - No active pregnancy on record        Passed - No positive pregnancy test in past 12 months           *MESSAGE FROM PHARMACY*  Alternative requested. Patient's been taking 30 mg every day and insurance limits max 1 cap/day. Please send new Rx for 30 mg once daily.     Last Written Prescription Date:  8/13/2020  Last Fill Quantity: 90,  # refills: 3   Last office visit: 9/24/2020 with prescribing provider:  Katherine Jorge   Future Office Visit:  none          "

## 2020-12-07 ENCOUNTER — MYC MEDICAL ADVICE (OUTPATIENT)
Dept: OBGYN | Facility: CLINIC | Age: 30
End: 2020-12-07

## 2020-12-07 DIAGNOSIS — E28.2 PCOS (POLYCYSTIC OVARIAN SYNDROME): Primary | ICD-10-CM

## 2020-12-07 NOTE — TELEPHONE ENCOUNTER
Annual OV:  8/13/2020:  She will call when she wants to start cycles    Mychart msg requesting to re-start OCP.    Med hx:  Pt reported   drospirenone-ethinyl estradiol (OCELLA) 3-0.03 MG tablet    Routing to provider for review/recommendation.    Judith Dela Cruz RN on 12/7/2020 at 2:08 PM

## 2020-12-08 RX ORDER — DROSPIRENONE AND ETHINYL ESTRADIOL 0.03MG-3MG
1 KIT ORAL DAILY
Qty: 90 TABLET | Refills: 4 | Status: SHIPPED | OUTPATIENT
Start: 2020-12-08 | End: 2021-09-01

## 2021-01-29 ENCOUNTER — VIRTUAL VISIT (OUTPATIENT)
Dept: OBGYN | Facility: CLINIC | Age: 31
End: 2021-01-29
Payer: COMMERCIAL

## 2021-01-29 DIAGNOSIS — F41.9 ANXIETY: Primary | ICD-10-CM

## 2021-01-29 PROCEDURE — 99213 OFFICE O/P EST LOW 20 MIN: CPT | Mod: TEL | Performed by: OBSTETRICS & GYNECOLOGY

## 2021-01-29 RX ORDER — BUPROPION HYDROCHLORIDE 150 MG/1
150 TABLET ORAL EVERY MORNING
Qty: 30 TABLET | Refills: 4 | Status: SHIPPED | OUTPATIENT
Start: 2021-01-29 | End: 2021-07-19 | Stop reason: SINTOL

## 2021-01-29 NOTE — PROGRESS NOTES
SUBJECTIVE:                                                   Ashley Cordon is a 30 year old female who presents to clinic today for the following health issue(s):  Patient presents with:  Anxiety: patient is having more anxiety, along with insomnia    HPI:    Patient with insomnia concerns.  Patient states that she has more anxiety.  She has more stresses in life.  She is doing well in terms of mood on the cymbalta 60 mg daily.    She has a great support group.    No LMP recorded. (Menstrual status: Irregular Periods)..   Patient is sexually active, .  Using oral contraceptives for contraception.    reports that she has never smoked. She has never used smokeless tobacco.  Health maintenance updated:  yes    PHQ-9 SCORE 2020   PHQ-9 Total Score MyChart  11 (Moderate depression)   PHQ-9 Total Score 3 11   LIYA-7 SCORE 2020   Total Score  14 (moderate anxiety)   Total Score 7 14       Problem list and histories reviewed & adjusted, as indicated.  Additional history: as documented.    Patient Active Problem List   Diagnosis      delivery delivered     KAI III (cervical intraepithelial neoplasia grade III) with severe dysplasia     Missed      Abnormal uterine bleeding     Past Surgical History:   Procedure Laterality Date      SECTION N/A 9/15/2017    Procedure:  SECTION;;  Surgeon: Katherine Douglass MD;  Location:  L+D     D & C  , 2016     DILATION AND CURETTAGE, OPERATIVE HYSTEROSCOPY, COMBINED N/A 10/21/2020    Procedure: HYSTEROSCOPY, DILATION AND CURETTAGE, MYOSURE MORCILLATOR;  Surgeon: Katherine Douglass MD;  Location:  OR     [facility]        Social History     Tobacco Use     Smoking status: Never Smoker     Smokeless tobacco: Never Used   Substance Use Topics     Alcohol use: Yes      Problem (# of Occurrences) Relation (Name,Age of Onset)    Breast Cancer (1) Maternal Grandmother: 50    Myocardial  Infarction (1) Maternal Grandfather    No Known Problems (2) Mother, Father    Uterine Cancer (1) Maternal Grandmother            Current Outpatient Medications   Medication Sig     ASPIRIN PO Take 81 mg by mouth daily      cholecalciferol (VITAMIN D3) 125 MCG (5000 UT) TABS tablet Take 125 mcg by mouth daily      drospirenone-ethinyl estradiol (TERRY) 3-0.03 MG tablet Take 1 tablet by mouth daily     DULoxetine (CYMBALTA) 30 MG capsule Take 1 capsule (30 mg) by mouth daily     vitamin B complex with vitamin C (VITAMIN  B COMPLEX) TABS tablet Take 1 tablet by mouth 2 times daily      No current facility-administered medications for this visit.      Allergies   Allergen Reactions     Fentanyl Nausea and Vomiting       ROS:  12 point review of systems negative other than symptoms noted below or in the HPI.  Psychiatric: Anxiety, Depression and Difficulty Sleeping  No urinary frequency or dysuria, bladder or kidney problems      OBJECTIVE:     There were no vitals taken for this visit.  There is no height or weight on file to calculate BMI.    Exam:  Constitutional:  Normal mentation    In-Clinic Test Results:  No results found for this or any previous visit (from the past 24 hour(s)).    ASSESSMENT/PLAN:                                                        ICD-10-CM    1. Anxiety  F41.9        There are no Patient Instructions on file for this visit.    1. Continue cymbalta 60 mg daily and add wellbutrin 150 mg.  2. Phone appt in 2 weeks    Katherine Jorge MD  [facility]

## 2021-02-22 ENCOUNTER — MYC MEDICAL ADVICE (OUTPATIENT)
Dept: OBGYN | Facility: CLINIC | Age: 31
End: 2021-02-22

## 2021-02-22 NOTE — TELEPHONE ENCOUNTER
1/29/21 virtual visit with Dr Jorge - Rx for wellbutrin 150 mg and f/u in 2 weeks    Pt providing update per mychart   See mychart and advise.    Abena Randall RN on 2/22/2021 at 4:09 PM

## 2021-03-13 NOTE — ED TRIAGE NOTES
Recent diagnosis of C-diff in October. States abdominal pain and cramping noted yesterday. Also states stool is similar.   No

## 2021-05-03 ENCOUNTER — IMMUNIZATION (OUTPATIENT)
Dept: NURSING | Facility: CLINIC | Age: 31
End: 2021-05-03
Payer: COMMERCIAL

## 2021-05-03 PROCEDURE — 91300 PR COVID VAC PFIZER DIL RECON 30 MCG/0.3 ML IM: CPT

## 2021-05-03 PROCEDURE — 0001A PR COVID VAC PFIZER DIL RECON 30 MCG/0.3 ML IM: CPT

## 2021-05-24 ENCOUNTER — IMMUNIZATION (OUTPATIENT)
Dept: NURSING | Facility: CLINIC | Age: 31
End: 2021-05-24
Attending: INTERNAL MEDICINE
Payer: COMMERCIAL

## 2021-05-24 PROCEDURE — 0002A PR COVID VAC PFIZER DIL RECON 30 MCG/0.3 ML IM: CPT

## 2021-05-24 PROCEDURE — 91300 PR COVID VAC PFIZER DIL RECON 30 MCG/0.3 ML IM: CPT

## 2021-05-26 ENCOUNTER — E-VISIT (OUTPATIENT)
Dept: URGENT CARE | Facility: URGENT CARE | Age: 31
End: 2021-05-26
Payer: COMMERCIAL

## 2021-05-26 ENCOUNTER — MYC MEDICAL ADVICE (OUTPATIENT)
Dept: OBGYN | Facility: CLINIC | Age: 31
End: 2021-05-26

## 2021-05-26 DIAGNOSIS — L70.9 ACNE, UNSPECIFIED ACNE TYPE: ICD-10-CM

## 2021-05-26 DIAGNOSIS — R21 RASH: Primary | ICD-10-CM

## 2021-05-26 DIAGNOSIS — L01.00 IMPETIGO: Primary | ICD-10-CM

## 2021-05-26 PROCEDURE — 99422 OL DIG E/M SVC 11-20 MIN: CPT | Performed by: PREVENTIVE MEDICINE

## 2021-05-26 RX ORDER — SULFAMETHOXAZOLE/TRIMETHOPRIM 800-160 MG
1 TABLET ORAL 2 TIMES DAILY
Qty: 14 TABLET | Refills: 0 | Status: SHIPPED | OUTPATIENT
Start: 2021-05-26 | End: 2021-06-02

## 2021-05-26 RX ORDER — ADAPALENE 0.1 G/100G
CREAM TOPICAL AT BEDTIME
Qty: 45 G | Refills: 0 | Status: SHIPPED | OUTPATIENT
Start: 2021-05-26 | End: 2021-09-01

## 2021-05-26 NOTE — TELEPHONE ENCOUNTER
Called patient and prescribed bactrim for possible impetigo. She will take this and also use over the counter lidocaine cream for pain control.  Consider using differin cream if not improving for possible acne if not improving over the next week.

## 2021-05-26 NOTE — TELEPHONE ENCOUNTER
Routing pt Micellohart message to provider to advise.  Monica Retana RN on 5/26/2021 at 11:20 AM

## 2021-07-19 DIAGNOSIS — F32.A DEPRESSION: Primary | ICD-10-CM

## 2021-07-19 DIAGNOSIS — Z01.419 ENCOUNTER FOR GYNECOLOGICAL EXAMINATION WITHOUT ABNORMAL FINDING: ICD-10-CM

## 2021-07-19 RX ORDER — DULOXETIN HYDROCHLORIDE 60 MG/1
60 CAPSULE, DELAYED RELEASE ORAL DAILY
Qty: 90 CAPSULE | Refills: 0 | Status: SHIPPED | OUTPATIENT
Start: 2021-07-19 | End: 2021-09-01

## 2021-07-19 RX ORDER — DULOXETIN HYDROCHLORIDE 60 MG/1
CAPSULE, DELAYED RELEASE ORAL
COMMUNITY
Start: 2021-04-26 | End: 2021-07-19

## 2021-07-19 NOTE — TELEPHONE ENCOUNTER
"Requested Prescriptions   Pending Prescriptions Disp Refills     DULoxetine (CYMBALTA) 60 MG capsule       Sig: Take 1 capsule (60 mg) by mouth daily       Serotonin-Norepinephrine Reuptake Inhibitors  Passed - 7/19/2021  2:41 PM        Passed - Blood pressure under 140/90 in past 12 months     BP Readings from Last 3 Encounters:   10/21/20 109/64   09/24/20 90/60   08/13/20 114/62                 Passed - Recent (12 mo) or future (30 days) visit within the authorizing provider's specialty     Patient has had an office visit with the authorizing provider or a provider within the authorizing providers department within the previous 12 mos or has a future within next 30 days. See \"Patient Info\" tab in inbasket, or \"Choose Columns\" in Meds & Orders section of the refill encounter.              Passed - Medication is active on med list        Passed - Patient is age 18 or older        Passed - No active pregnancy on record        Passed - No positive pregnancy test in past 12 months         Signed Prescriptions Disp Refills    DULoxetine (CYMBALTA) 60 MG capsule         There is no refill protocol information for this order        Last Written Prescription Date:  7/16/20  Last Fill Quantity: 90,  # refills: 4   Last visit: virtual visit 1/29/21 prescribing provider:  Dr Katherine Jorge   Future Office Visit:  none        Prescription approved per [facility] Refill Protocol.      Gina Tirado RN        "

## 2021-08-31 NOTE — PROGRESS NOTES
Ashley is a 31 year old  female who presents for annual exam.     Besides routine health maintenance, she has no other health concerns today .    HPI:  The patient's PCP is  Katherine Jorge MD.    STD screening  Feeling well on medication at this time      GYNECOLOGIC HISTORY:    Patient's last menstrual period was 2021 (exact date).    Regular menses? yes  Menses every 30 days.  Length of menses: 4 days    Her current contraception method is: oral contraceptives.  She  reports that she has never smoked. She has never used smokeless tobacco.    Patient is sexually active.  STD testing offered?  Accepted  Last PHQ-9 score on record =   PHQ-9 SCORE 2021   PHQ-9 Total Score MyChart -   PHQ-9 Total Score 5     Last GAD7 score on record =   LIYA-7 SCORE 2021   Total Score -   Total Score 6     Alcohol score-3    HEALTH MAINTENANCE:  Cholesterol: (No results found for: CHOL   Last Mammo: Not applicable, Result: Not applicable, Next Mammo: Due at age 39   Pap: (No results found for: PAP )  19 WNL HPV (-)neg  Colonoscopy:  NA, Result: Not applicable, Next Colonoscopy: NA years.  Dexa:  NA    Health maintenance updated:  yes    HISTORY:  OB History    Para Term  AB Living   3 1 1 0 2 1   SAB TAB Ectopic Multiple Live Births   2 0 0 0 1      # Outcome Date GA Lbr Gerald/2nd Weight Sex Delivery Anes PTL Lv   3 Term 09/15/17 40w3d  3.374 kg (7 lb 7 oz) F   N ROBLES      Name: May      Apgar1: 9  Apgar5: 9   2 2016     AB, MISSED      1 2015     AB, MISSED          Patient Active Problem List   Diagnosis      delivery delivered     KAI III (cervical intraepithelial neoplasia grade III) with severe dysplasia     Missed      Abnormal uterine bleeding     Past Surgical History:   Procedure Laterality Date      SECTION N/A 9/15/2017    Procedure:  SECTION;;  Surgeon: Katherine Douglass MD;  Location:  [facility]     [facility] & C  ,       "DILATION AND CURETTAGE, OPERATIVE HYSTEROSCOPY, COMBINED N/A 10/21/2020    Procedure: HYSTEROSCOPY, DILATION AND CURETTAGE, MYOSURE MORCILLATOR;  Surgeon: Katherine Douglass MD;  Location:  OR     wis[facility] teeth        Social History     Tobacco Use     Smoking status: Never Smoker     Smokeless tobacco: Never Used   Substance Use Topics     Alcohol use: Yes      Problem (# of Occurrences) Relation (Name,Age of Onset)    Breast Cancer (1) Maternal Grandmother: 50    Myocardial Infarction (1) Maternal Grandfather    No Known Problems (7) Mother, Father, Sister, Brother, Paternal Grandmother, Paternal Grandfather, Other    Uterine Cancer (1) Maternal Grandmother            Current Outpatient Medications   Medication Sig     ASPIRIN PO Take 81 mg by mouth daily      cholecalciferol (VITAMIN D3) 125 MCG (5000 UT) TABS tablet Take 125 mcg by mouth daily      drospirenone-ethinyl estradiol (TERRY) 3-0.03 MG tablet Take 1 tablet by mouth daily     DULoxetine (CYMBALTA) 60 MG capsule Take 1 capsule (60 mg) by mouth daily     traMADol (ULTRAM) 50 MG tablet Take 50 mg by mouth     vitamin B complex with vitamin C (VITAMIN  B COMPLEX) TABS tablet Take 1 tablet by mouth 2 times daily      No current facility-administered medications for this visit.     Allergies   Allergen Reactions     Fentanyl Nausea and Vomiting       Past medical, surgical, social and family histories were reviewed and updated in EPIC.    ROS:   12 point review of systems negative other than symptoms noted below or in the HPI.  No urinary frequency or dysuria, bladder or kidney problems    EXAM:  /74   Ht 1.562 m (5' 1.5\")   Wt 52.2 kg (115 lb)   LMP 08/12/2021 (Exact Date)   Breastfeeding No   BMI 21.38 kg/m     BMI: Body mass index is 21.38 kg/m .    PHYSICAL EXAM:  Constitutional:   Appearance: Well nourished, well developed, alert, in no acute distress    Chest:  Respiratory Effort:  Breathing " unlabored  Cardiovascular:    Heart: Auscultation:  Regular rate, normal rhythm, no murmurs present  Breasts: Inspection of Breasts:  No lymphadenopathy present., Palpation of Breasts and Axillae:  No masses present on palpation, no breast tenderness., Axillary Lymph Nodes:  No lymphadenopathy present. and No nodularity, asymmetry or nipple discharge bilaterally.    Lymphatic: Lymph Nodes:  No other lymphadenopathy present  Skin:  General Inspection:  No rashes present, no lesions present, no areas of  discoloration  Neurologic:    Mental Status:  Oriented X3.  Normal strength and tone, sensory exam                grossly normal, mentation intact and speech normal.    Psychiatric:   Mentation appears normal and affect normal/bright.         Pelvic Exam:  External Genitalia:     Normal appearance for age, no discharge present, no tenderness present, no inflammatory lesions present, color normal  Vagina:     Normal vaginal vault without central or paravaginal defects, no discharge present, no inflammatory lesions present, no masses present  Bladder:     Nontender to palpation  Urethra:   Urethral Body:  Urethra palpation normal, urethra structural support normal   Urethral Meatus:  No erythema or lesions present  Cervix:     Appearance healthy, no lesions present, nontender to palpation, no bleeding present  Uterus:     Uterus: firm, normal sized and nontender  Adnexa:     No adnexal tenderness present, no adnexal masses present  Perineum:     Perineum within normal limits, no evidence of trauma, no rashes or skin lesions present  Anus:     Anus within normal limits, no hemorrhoids present  Inguinal Lymph Nodes:     No lymphadenopathy present  Pubic Hair:     Normal pubic hair distribution for age  Genitalia and Groin:     No rashes present, no lesions present, no areas of discoloration, no masses present      COUNSELING:   Reviewed preventive health counseling, as reflected in patient instructions    BMI: Body mass  index is 21.38 kg/m .      ASSESSMENT:  31 year old female with satisfactory annual exam.    ICD-10-CM    1. Encounter for gynecological examination without abnormal finding  Z01.419 DULoxetine (CYMBALTA) 60 MG capsule   2. PCOS (polycystic ovarian syndrome)  E28.2 drospirenone-ethinyl estradiol (TERRY) 3-0.03 MG tablet   3. Screen for STD (sexually transmitted disease)  Z11.3 Herpes Simplex Virus 1 and 2 IgG     Treponema Abs w Reflex to RPR and Titer     NEISSERIA GONORRHOEA PCR     Hepatitis B Surface  Antigen     Herpes Simplex Virus 1 and 2 IgG     Treponema Abs w Reflex to RPR and Titer     Hepatitis B Surface  Antigen   4. Screening for chlamydial disease  Z11.8 CHLAMYDIA TRACHOMATIS PCR   5. Screening for HIV (human immunodeficiency virus)  Z11.4 HIV Antigen Antibody Combo     HIV Antigen Antibody Combo       PLAN:  1. Continue medication at this time  2. She isn't due for a pap today  3. Std testing  4. Pap smear next year      Katherine Jorge MD

## 2021-09-01 ENCOUNTER — OFFICE VISIT (OUTPATIENT)
Dept: OBGYN | Facility: CLINIC | Age: 31
End: 2021-09-01
Payer: COMMERCIAL

## 2021-09-01 VITALS
DIASTOLIC BLOOD PRESSURE: 74 MMHG | HEIGHT: 62 IN | SYSTOLIC BLOOD PRESSURE: 122 MMHG | WEIGHT: 115 LBS | BODY MASS INDEX: 21.16 KG/M2

## 2021-09-01 DIAGNOSIS — Z11.3 SCREEN FOR STD (SEXUALLY TRANSMITTED DISEASE): ICD-10-CM

## 2021-09-01 DIAGNOSIS — E28.2 PCOS (POLYCYSTIC OVARIAN SYNDROME): ICD-10-CM

## 2021-09-01 DIAGNOSIS — Z01.419 ENCOUNTER FOR GYNECOLOGICAL EXAMINATION WITHOUT ABNORMAL FINDING: Primary | ICD-10-CM

## 2021-09-01 DIAGNOSIS — Z11.4 SCREENING FOR HIV (HUMAN IMMUNODEFICIENCY VIRUS): ICD-10-CM

## 2021-09-01 DIAGNOSIS — Z11.8 SCREENING FOR CHLAMYDIAL DISEASE: ICD-10-CM

## 2021-09-01 PROCEDURE — 36415 COLL VENOUS BLD VENIPUNCTURE: CPT | Performed by: OBSTETRICS & GYNECOLOGY

## 2021-09-01 PROCEDURE — 86695 HERPES SIMPLEX TYPE 1 TEST: CPT | Performed by: OBSTETRICS & GYNECOLOGY

## 2021-09-01 PROCEDURE — 87591 N.GONORRHOEAE DNA AMP PROB: CPT | Performed by: OBSTETRICS & GYNECOLOGY

## 2021-09-01 PROCEDURE — 87389 HIV-1 AG W/HIV-1&-2 AB AG IA: CPT | Performed by: OBSTETRICS & GYNECOLOGY

## 2021-09-01 PROCEDURE — 99395 PREV VISIT EST AGE 18-39: CPT | Performed by: OBSTETRICS & GYNECOLOGY

## 2021-09-01 PROCEDURE — 86780 TREPONEMA PALLIDUM: CPT | Performed by: OBSTETRICS & GYNECOLOGY

## 2021-09-01 PROCEDURE — 86696 HERPES SIMPLEX TYPE 2 TEST: CPT | Performed by: OBSTETRICS & GYNECOLOGY

## 2021-09-01 PROCEDURE — 87491 CHLMYD TRACH DNA AMP PROBE: CPT | Performed by: OBSTETRICS & GYNECOLOGY

## 2021-09-01 PROCEDURE — 87340 HEPATITIS B SURFACE AG IA: CPT | Performed by: OBSTETRICS & GYNECOLOGY

## 2021-09-01 RX ORDER — DROSPIRENONE AND ETHINYL ESTRADIOL 0.03MG-3MG
1 KIT ORAL DAILY
Qty: 90 TABLET | Refills: 4 | Status: SHIPPED | OUTPATIENT
Start: 2021-09-01

## 2021-09-01 RX ORDER — TRAMADOL HYDROCHLORIDE 50 MG/1
50 TABLET ORAL
COMMUNITY
Start: 2021-08-24

## 2021-09-01 RX ORDER — DULOXETIN HYDROCHLORIDE 60 MG/1
60 CAPSULE, DELAYED RELEASE ORAL DAILY
Qty: 90 CAPSULE | Refills: 3 | Status: SHIPPED | OUTPATIENT
Start: 2021-09-01 | End: 2022-11-07

## 2021-09-01 ASSESSMENT — MIFFLIN-ST. JEOR: SCORE: 1176.95

## 2021-09-01 ASSESSMENT — ANXIETY QUESTIONNAIRES
IF YOU CHECKED OFF ANY PROBLEMS ON THIS QUESTIONNAIRE, HOW DIFFICULT HAVE THESE PROBLEMS MADE IT FOR YOU TO DO YOUR WORK, TAKE CARE OF THINGS AT HOME, OR GET ALONG WITH OTHER PEOPLE: SOMEWHAT DIFFICULT
6. BECOMING EASILY ANNOYED OR IRRITABLE: NOT AT ALL
3. WORRYING TOO MUCH ABOUT DIFFERENT THINGS: SEVERAL DAYS
1. FEELING NERVOUS, ANXIOUS, OR ON EDGE: SEVERAL DAYS
7. FEELING AFRAID AS IF SOMETHING AWFUL MIGHT HAPPEN: NOT AT ALL
GAD7 TOTAL SCORE: 6
2. NOT BEING ABLE TO STOP OR CONTROL WORRYING: SEVERAL DAYS
5. BEING SO RESTLESS THAT IT IS HARD TO SIT STILL: SEVERAL DAYS

## 2021-09-01 ASSESSMENT — PATIENT HEALTH QUESTIONNAIRE - PHQ9
5. POOR APPETITE OR OVEREATING: MORE THAN HALF THE DAYS
SUM OF ALL RESPONSES TO PHQ QUESTIONS 1-9: 5

## 2021-09-02 LAB
HBV SURFACE AG SERPL QL IA: NONREACTIVE
HIV 1+2 AB+HIV1 P24 AG SERPL QL IA: NONREACTIVE
T PALLIDUM AB SER QL: NONREACTIVE

## 2021-09-02 ASSESSMENT — ANXIETY QUESTIONNAIRES: GAD7 TOTAL SCORE: 6

## 2021-09-03 LAB
C TRACH DNA SPEC QL NAA+PROBE: NEGATIVE
HSV1 IGG SERPL QL IA: 0.03 INDEX
HSV1 IGG SERPL QL IA: NORMAL
HSV2 IGG SERPL QL IA: 0.07 INDEX
HSV2 IGG SERPL QL IA: NORMAL
N GONORRHOEA DNA SPEC QL NAA+PROBE: NEGATIVE

## 2021-10-09 ENCOUNTER — HEALTH MAINTENANCE LETTER (OUTPATIENT)
Age: 31
End: 2021-10-09

## 2022-09-11 ENCOUNTER — HEALTH MAINTENANCE LETTER (OUTPATIENT)
Age: 32
End: 2022-09-11

## 2022-11-07 ENCOUNTER — TELEPHONE (OUTPATIENT)
Dept: OBGYN | Facility: CLINIC | Age: 32
End: 2022-11-07

## 2022-11-07 DIAGNOSIS — Z01.419 ENCOUNTER FOR GYNECOLOGICAL EXAMINATION WITHOUT ABNORMAL FINDING: ICD-10-CM

## 2022-11-07 RX ORDER — DULOXETIN HYDROCHLORIDE 60 MG/1
60 CAPSULE, DELAYED RELEASE ORAL DAILY
Qty: 30 CAPSULE | Refills: 0 | Status: SHIPPED | OUTPATIENT
Start: 2022-11-07 | End: 2022-12-06

## 2022-11-07 NOTE — TELEPHONE ENCOUNTER
Patient needs a medication refill and will be transferring over to the [facility], but is out of her medication and will need the refill today if possible. Pharmacy on file is correct. Please call back.

## 2022-11-07 NOTE — TELEPHONE ENCOUNTER
Requested Prescriptions   Pending Prescriptions Disp Refills     DULoxetine (CYMBALTA) 60 MG capsule 90 capsule 3     Sig: Take 1 capsule (60 mg) by mouth daily       There is no refill protocol information for this order        Last Written Prescription Date:  9/1/21  Last Fill Quantity: 90,  # refills: 3   Last office visit: 9/1/2021 with prescribing provider:  Dr Jorge   Future Office Visit:      Medication is being filled for 1 time refill only due to:  Patient needs to be seen because it has been more than one year since last visit.  Abena Randall RN on 11/7/2022 at 12:01 PM

## 2022-12-06 ENCOUNTER — MYC REFILL (OUTPATIENT)
Dept: OBGYN | Facility: CLINIC | Age: 32
End: 2022-12-06

## 2022-12-06 DIAGNOSIS — Z01.419 ENCOUNTER FOR GYNECOLOGICAL EXAMINATION WITHOUT ABNORMAL FINDING: ICD-10-CM

## 2022-12-06 RX ORDER — DULOXETIN HYDROCHLORIDE 60 MG/1
60 CAPSULE, DELAYED RELEASE ORAL DAILY
Qty: 30 CAPSULE | Refills: 0 | Status: SHIPPED | OUTPATIENT
Start: 2022-12-06

## 2022-12-06 NOTE — TELEPHONE ENCOUNTER
"Requested Prescriptions   Pending Prescriptions Disp Refills     DULoxetine (CYMBALTA) 60 MG capsule 30 capsule 0     Sig: Take 1 capsule (60 mg) by mouth daily       Serotonin-Norepinephrine Reuptake Inhibitors  Failed - 12/6/2022 12:34 PM        Failed - Blood pressure under 140/90 in past 12 months     BP Readings from Last 3 Encounters:   09/01/21 122/74   10/21/20 109/64   09/24/20 90/60                 Failed - Recent (12 mo) or future (30 days) visit within the authorizing provider's specialty     Patient has had an office visit with the authorizing provider or a provider within the authorizing providers department within the previous 12 mos or has a future within next 30 days. See \"Patient Info\" tab in inbasket, or \"Choose Columns\" in Meds & Orders section of the refill encounter.              Passed - Medication is active on med list        Passed - Patient is age 18 or older        Passed - No active pregnancy on record        Passed - No positive pregnancy test in past 12 months           Last Written Prescription Date:  11/7/22  Last Fill Quantity: 30,  # refills: 0   Last office visit: 9/1/2021 with prescribing provider:  Dr Jorge   Future Office Visit:      Medication is being filled for 1 time refill only due to:  Patient needs to be seen because it has been more than one year since last visit. Pt is requesting extension until seen by another provider.    NO FURTHER REFILLS UNTIL SEEN> Message sent to pt via elissa Randall RN on 12/6/2022 at 1:14 PM          "

## 2022-12-28 DIAGNOSIS — Z01.419 ENCOUNTER FOR GYNECOLOGICAL EXAMINATION WITHOUT ABNORMAL FINDING: ICD-10-CM

## 2022-12-28 RX ORDER — DULOXETIN HYDROCHLORIDE 60 MG/1
CAPSULE, DELAYED RELEASE ORAL
Qty: 30 CAPSULE | Refills: 0 | OUTPATIENT
Start: 2022-12-28

## 2022-12-28 NOTE — TELEPHONE ENCOUNTER
"Requested Prescriptions   Pending Prescriptions Disp Refills     DULoxetine (CYMBALTA) 60 MG capsule [Pharmacy Med Name: DULOXETINE HCL DR 60 MG CAP] 30 capsule 0     Sig: TAKE 1 CAPSULE BY MOUTH EVERY DAY       Serotonin-Norepinephrine Reuptake Inhibitors  Failed - 12/28/2022 11:32 AM        Failed - Blood pressure under 140/90 in past 12 months     BP Readings from Last 3 Encounters:   09/01/21 122/74   10/21/20 109/64   09/24/20 90/60                 Failed - Recent (12 mo) or future (30 days) visit within the authorizing provider's specialty     Patient has had an office visit with the authorizing provider or a provider within the authorizing providers department within the previous 12 mos or has a future within next 30 days. See \"Patient Info\" tab in inbasket, or \"Choose Columns\" in Meds & Orders section of the refill encounter.              Passed - Medication is active on med list        Passed - Patient is age 18 or older        Passed - No active pregnancy on record        Passed - No positive pregnancy test in past 12 months           Pt due for annual, no appt scheduled. Pt already received one month extension. Rx denied.   Meghann Riddle RN on 12/28/2022 at 12:33 PM    "

## 2023-01-23 ENCOUNTER — HEALTH MAINTENANCE LETTER (OUTPATIENT)
Age: 33
End: 2023-01-23

## 2023-09-08 NOTE — TELEPHONE ENCOUNTER
Patient called to give the fax number the nurse was requesting for Emanuel Brown [phone] She could come in for ultrasound, she has history of PCOS and not her typical.  Is she taking the metformin?

## 2024-02-24 ENCOUNTER — HEALTH MAINTENANCE LETTER (OUTPATIENT)
Age: 34
End: 2024-02-24

## 2024-09-27 NOTE — TELEPHONE ENCOUNTER
Patient called requesting refill. Gave patient number to [facility] where she is seen.    RE:[employer]  Key: [id number]      [employer] request was added and submitted via CMM

## (undated) DEVICE — SOL WATER IRRIG 1000ML BOTTLE 07139-09

## (undated) DEVICE — HEMOSTAT ABSORBABLE POWDER ARISTA 1GM SM0005-USA

## (undated) DEVICE — SOL WATER IRRIG 1000ML BOTTLE 2F7114

## (undated) DEVICE — LINEN C-SECTION 5415

## (undated) DEVICE — DRSG STERI STRIP 1/2X4" R1547

## (undated) DEVICE — SUCTION TRAP DELEE 10FR 20ML

## (undated) DEVICE — SU VICRYL 2-0 CT-1 27" J339H

## (undated) DEVICE — CATH TRAY FOLEY 16FR BARDEX W/DRAIN BAG STATLOCK 300316A

## (undated) DEVICE — GLOVE PROTEXIS W/NEU-THERA 7.5  2D73TE75

## (undated) DEVICE — SOL NACL 0.9% IRRIG 1000ML BOTTLE 07138-09

## (undated) DEVICE — PREP CHLORAPREP 26ML TINTED ORANGE  260815

## (undated) DEVICE — GOWN LG DISP 9515

## (undated) DEVICE — SOL NACL 0.9% IRRIG 1000ML BOTTLE 2F7124

## (undated) DEVICE — KIT PROCEDURE FLUENT IN/OUT FLOWPAK TISS TRAP FLT-112S

## (undated) DEVICE — DEVICE TISSUE REMOVAL HYSTEROSCOPIC MYOSURE LITE 30-401LITE

## (undated) DEVICE — SU VICRYL 0 CT 36" J358H

## (undated) DEVICE — TUBING SUCTION 12"X1/4" N612

## (undated) DEVICE — LINEN TOWEL PACK X5 5464

## (undated) DEVICE — GLOVE PROTEXIS MICRO 7.0  2D73PM70

## (undated) DEVICE — CATH INTERMITTENT CLEAN-CATH FEMALE 14FR 6" VINYL LF 420614

## (undated) DEVICE — SUCTION CANISTER MEDIVAC LINER 3000ML W/LID 65651-530

## (undated) DEVICE — SOL NACL 0.9% INJ 1000ML BAG 2B1324X

## (undated) DEVICE — TUBING IRRIG TUR Y TYPE 96" LF 6543-01

## (undated) DEVICE — DRAPE IOBAN C-SECTION W/POUCH 30X35" 6657

## (undated) DEVICE — PACK C-SECTION LF PL15OTA83B

## (undated) DEVICE — ESU GROUND PAD UNIVERSAL W/O CORD

## (undated) DEVICE — PACK TVT HYSTEROSCOPY SMA15HYFSE

## (undated) DEVICE — SU VICRYL 4-0 P-3 18" J464G

## (undated) DEVICE — BLADE CLIPPER 4406

## (undated) RX ORDER — PROPOFOL 10 MG/ML
INJECTION, EMULSION INTRAVENOUS
Status: DISPENSED
Start: 2020-10-21

## (undated) RX ORDER — ONDANSETRON 2 MG/ML
INJECTION INTRAMUSCULAR; INTRAVENOUS
Status: DISPENSED
Start: 2020-10-21

## (undated) RX ORDER — ACETAMINOPHEN 325 MG/1
TABLET ORAL
Status: DISPENSED
Start: 2020-10-21

## (undated) RX ORDER — OXYCODONE HYDROCHLORIDE 5 MG/1
TABLET ORAL
Status: DISPENSED
Start: 2020-10-21

## (undated) RX ORDER — DEXAMETHASONE SODIUM PHOSPHATE 4 MG/ML
INJECTION, SOLUTION INTRA-ARTICULAR; INTRALESIONAL; INTRAMUSCULAR; INTRAVENOUS; SOFT TISSUE
Status: DISPENSED
Start: 2020-10-21

## (undated) RX ORDER — FENTANYL CITRATE 0.05 MG/ML
INJECTION, SOLUTION INTRAMUSCULAR; INTRAVENOUS
Status: DISPENSED
Start: 2020-10-21